# Patient Record
Sex: FEMALE | Race: WHITE | NOT HISPANIC OR LATINO | Employment: FULL TIME | URBAN - METROPOLITAN AREA
[De-identification: names, ages, dates, MRNs, and addresses within clinical notes are randomized per-mention and may not be internally consistent; named-entity substitution may affect disease eponyms.]

---

## 2023-03-07 DIAGNOSIS — Z79.01 ANTICOAGULATION ADEQUATE WITH ANTICOAGULANT THERAPY: Primary | ICD-10-CM

## 2023-05-04 ENCOUNTER — OFFICE VISIT (OUTPATIENT)
Dept: PRIMARY CARE | Facility: CLINIC | Age: 68
End: 2023-05-04
Payer: COMMERCIAL

## 2023-05-04 VITALS
HEART RATE: 88 BPM | HEIGHT: 64 IN | BODY MASS INDEX: 38.76 KG/M2 | DIASTOLIC BLOOD PRESSURE: 74 MMHG | SYSTOLIC BLOOD PRESSURE: 112 MMHG | TEMPERATURE: 97.2 F | WEIGHT: 227 LBS | OXYGEN SATURATION: 98 %

## 2023-05-04 DIAGNOSIS — I10 ESSENTIAL HYPERTENSION: Primary | ICD-10-CM

## 2023-05-04 DIAGNOSIS — R53.83 FATIGUE, UNSPECIFIED TYPE: ICD-10-CM

## 2023-05-04 DIAGNOSIS — I87.2 CHRONIC VENOUS INSUFFICIENCY: ICD-10-CM

## 2023-05-04 PROBLEM — H52.12 MYOPIA OF LEFT EYE WITH ASTIGMATISM AND PRESBYOPIA: Status: ACTIVE | Noted: 2023-05-04

## 2023-05-04 PROBLEM — I25.10 CAD (CORONARY ATHEROSCLEROTIC DISEASE): Status: ACTIVE | Noted: 2023-05-04

## 2023-05-04 PROBLEM — Z96.659 STATUS POST TOTAL KNEE REPLACEMENT: Status: ACTIVE | Noted: 2023-05-04

## 2023-05-04 PROBLEM — E55.9 VITAMIN D DEFICIENCY: Status: ACTIVE | Noted: 2023-05-04

## 2023-05-04 PROBLEM — H52.201 HYPEROPIA OF RIGHT EYE WITH ASTIGMATISM AND PRESBYOPIA: Status: ACTIVE | Noted: 2023-05-04

## 2023-05-04 PROBLEM — B39.9 HISTOPLASMOSIS: Status: ACTIVE | Noted: 2023-05-04

## 2023-05-04 PROBLEM — L72.0 INFECTED EPIDERMOID CYST: Status: RESOLVED | Noted: 2023-05-04 | Resolved: 2023-05-04

## 2023-05-04 PROBLEM — R63.4 RAPID WEIGHT LOSS: Status: RESOLVED | Noted: 2023-05-04 | Resolved: 2023-05-04

## 2023-05-04 PROBLEM — J45.901 ASTHMA EXACERBATION (HHS-HCC): Status: RESOLVED | Noted: 2023-05-04 | Resolved: 2023-05-04

## 2023-05-04 PROBLEM — B35.1 MYCOTIC TOENAILS: Status: ACTIVE | Noted: 2023-05-04

## 2023-05-04 PROBLEM — I48.0 AF (PAROXYSMAL ATRIAL FIBRILLATION) (MULTI): Status: ACTIVE | Noted: 2022-12-19

## 2023-05-04 PROBLEM — G47.00 INSOMNIA: Status: ACTIVE | Noted: 2023-05-04

## 2023-05-04 PROBLEM — M25.561 RIGHT KNEE PAIN: Status: RESOLVED | Noted: 2023-05-04 | Resolved: 2023-05-04

## 2023-05-04 PROBLEM — N83.202 CYST OF LEFT OVARY: Status: ACTIVE | Noted: 2023-05-04

## 2023-05-04 PROBLEM — E66.9 OBESITY: Status: RESOLVED | Noted: 2023-05-04 | Resolved: 2023-05-04

## 2023-05-04 PROBLEM — R94.2 ABNORMAL PFT: Status: ACTIVE | Noted: 2023-05-04

## 2023-05-04 PROBLEM — M17.11 PRIMARY OSTEOARTHRITIS OF RIGHT KNEE: Status: RESOLVED | Noted: 2023-05-04 | Resolved: 2023-05-04

## 2023-05-04 PROBLEM — M22.42 CHONDROMALACIA OF LEFT PATELLA: Status: RESOLVED | Noted: 2023-05-04 | Resolved: 2023-05-04

## 2023-05-04 PROBLEM — M25.562 LEFT KNEE PAIN: Status: RESOLVED | Noted: 2023-05-04 | Resolved: 2023-05-04

## 2023-05-04 PROBLEM — J20.9 ACUTE BRONCHITIS: Status: RESOLVED | Noted: 2023-05-04 | Resolved: 2023-05-04

## 2023-05-04 PROBLEM — R32 INCONTINENCE OF URINE: Status: ACTIVE | Noted: 2023-05-04

## 2023-05-04 PROBLEM — G47.34 SLEEP RELATED HYPOXIA: Status: ACTIVE | Noted: 2023-05-04

## 2023-05-04 PROBLEM — H20.9: Status: ACTIVE | Noted: 2023-05-04

## 2023-05-04 PROBLEM — K90.9 MALABSORPTION (HHS-HCC): Status: ACTIVE | Noted: 2023-05-04

## 2023-05-04 PROBLEM — M17.12 PRIMARY LOCALIZED OSTEOARTHROSIS OF LEFT LOWER LEG: Status: ACTIVE | Noted: 2023-05-04

## 2023-05-04 PROBLEM — J30.9 ALLERGIC RHINITIS: Status: ACTIVE | Noted: 2023-05-04

## 2023-05-04 PROBLEM — F32.A DEPRESSION, CONTROLLED: Status: ACTIVE | Noted: 2022-12-19

## 2023-05-04 PROBLEM — I31.9 PERICARDIAL DISEASE (HHS-HCC): Status: ACTIVE | Noted: 2023-05-04

## 2023-05-04 PROBLEM — Z96.1 PSEUDOPHAKIA OF RIGHT EYE: Status: ACTIVE | Noted: 2023-05-04

## 2023-05-04 PROBLEM — J45.40 MODERATE PERSISTENT ASTHMA WITHOUT COMPLICATION (HHS-HCC): Status: ACTIVE | Noted: 2023-05-04

## 2023-05-04 PROBLEM — D68.59 HYPERCOAGULABLE STATE (MULTI): Status: ACTIVE | Noted: 2023-05-04

## 2023-05-04 PROBLEM — R94.39 ABNORMAL STRESS TEST: Status: ACTIVE | Noted: 2023-05-04

## 2023-05-04 PROBLEM — H52.4 MYOPIA OF LEFT EYE WITH ASTIGMATISM AND PRESBYOPIA: Status: ACTIVE | Noted: 2023-05-04

## 2023-05-04 PROBLEM — Z98.84 BARIATRIC SURGERY STATUS: Status: ACTIVE | Noted: 2022-12-19

## 2023-05-04 PROBLEM — H52.4 HYPEROPIA OF RIGHT EYE WITH ASTIGMATISM AND PRESBYOPIA: Status: ACTIVE | Noted: 2023-05-04

## 2023-05-04 PROBLEM — F43.21 ACUTE ADJUSTMENT DISORDER WITH DEPRESSED MOOD: Status: ACTIVE | Noted: 2023-05-04

## 2023-05-04 PROBLEM — F32.0 DEPRESSION, MAJOR, SINGLE EPISODE, MILD (CMS-HCC): Status: ACTIVE | Noted: 2023-05-04

## 2023-05-04 PROBLEM — G47.19 EXCESSIVE DAYTIME SLEEPINESS: Status: ACTIVE | Noted: 2023-05-04

## 2023-05-04 PROBLEM — M71.21 SYNOVIAL CYST OF RIGHT KNEE: Status: RESOLVED | Noted: 2023-05-04 | Resolved: 2023-05-04

## 2023-05-04 PROBLEM — F33.1 MAJOR DEPRESSIVE DISORDER, RECURRENT EPISODE, MODERATE (MULTI): Status: ACTIVE | Noted: 2023-05-04

## 2023-05-04 PROBLEM — G47.33 OSA (OBSTRUCTIVE SLEEP APNEA): Status: ACTIVE | Noted: 2023-05-04

## 2023-05-04 PROBLEM — H52.01 HYPEROPIA OF RIGHT EYE WITH ASTIGMATISM AND PRESBYOPIA: Status: ACTIVE | Noted: 2023-05-04

## 2023-05-04 PROBLEM — M66.0 BAKER'S CYST, RUPTURED: Status: RESOLVED | Noted: 2023-05-04 | Resolved: 2023-05-04

## 2023-05-04 PROBLEM — E87.20 ACID EXCESS: Status: ACTIVE | Noted: 2023-05-04

## 2023-05-04 PROBLEM — H25.813 COMBINED FORM OF AGE-RELATED CATARACT, BOTH EYES: Status: ACTIVE | Noted: 2023-05-04

## 2023-05-04 PROBLEM — L08.9 INFECTED EPIDERMOID CYST: Status: RESOLVED | Noted: 2023-05-04 | Resolved: 2023-05-04

## 2023-05-04 PROBLEM — H52.202 MYOPIA OF LEFT EYE WITH ASTIGMATISM AND PRESBYOPIA: Status: ACTIVE | Noted: 2023-05-04

## 2023-05-04 LAB
ALANINE AMINOTRANSFERASE (SGPT) (U/L) IN SER/PLAS: 7 U/L (ref 7–45)
ALBUMIN (G/DL) IN SER/PLAS: 4.2 G/DL (ref 3.4–5)
ALKALINE PHOSPHATASE (U/L) IN SER/PLAS: 88 U/L (ref 33–136)
ANION GAP IN SER/PLAS: 15 MMOL/L (ref 10–20)
ASPARTATE AMINOTRANSFERASE (SGOT) (U/L) IN SER/PLAS: 13 U/L (ref 9–39)
BASOPHILS (10*3/UL) IN BLOOD BY AUTOMATED COUNT: 0.09 X10E9/L (ref 0–0.1)
BASOPHILS/100 LEUKOCYTES IN BLOOD BY AUTOMATED COUNT: 1.5 % (ref 0–2)
BILIRUBIN TOTAL (MG/DL) IN SER/PLAS: 0.4 MG/DL (ref 0–1.2)
CALCIDIOL (25 OH VITAMIN D3) (NG/ML) IN SER/PLAS: 27 NG/ML
CALCIUM (MG/DL) IN SER/PLAS: 10 MG/DL (ref 8.6–10.6)
CARBON DIOXIDE, TOTAL (MMOL/L) IN SER/PLAS: 28 MMOL/L (ref 21–32)
CHLORIDE (MMOL/L) IN SER/PLAS: 104 MMOL/L (ref 98–107)
COBALAMIN (VITAMIN B12) (PG/ML) IN SER/PLAS: 394 PG/ML (ref 211–911)
CREATININE (MG/DL) IN SER/PLAS: 0.96 MG/DL (ref 0.5–1.05)
EOSINOPHILS (10*3/UL) IN BLOOD BY AUTOMATED COUNT: 0.26 X10E9/L (ref 0–0.7)
EOSINOPHILS/100 LEUKOCYTES IN BLOOD BY AUTOMATED COUNT: 4.2 % (ref 0–6)
ERYTHROCYTE DISTRIBUTION WIDTH (RATIO) BY AUTOMATED COUNT: 14.6 % (ref 11.5–14.5)
ERYTHROCYTE MEAN CORPUSCULAR HEMOGLOBIN CONCENTRATION (G/DL) BY AUTOMATED: 30.8 G/DL (ref 32–36)
ERYTHROCYTE MEAN CORPUSCULAR VOLUME (FL) BY AUTOMATED COUNT: 89 FL (ref 80–100)
ERYTHROCYTES (10*6/UL) IN BLOOD BY AUTOMATED COUNT: 4.19 X10E12/L (ref 4–5.2)
FERRITIN (UG/LL) IN SER/PLAS: 21 UG/L (ref 8–150)
FOLATE (NG/ML) IN SER/PLAS: 8.8 NG/ML
GFR FEMALE: 65 ML/MIN/1.73M2
GLUCOSE (MG/DL) IN SER/PLAS: 99 MG/DL (ref 74–99)
HEMATOCRIT (%) IN BLOOD BY AUTOMATED COUNT: 37.3 % (ref 36–46)
HEMOGLOBIN (G/DL) IN BLOOD: 11.5 G/DL (ref 12–16)
IMMATURE GRANULOCYTES/100 LEUKOCYTES IN BLOOD BY AUTOMATED COUNT: 0.5 % (ref 0–0.9)
LEUKOCYTES (10*3/UL) IN BLOOD BY AUTOMATED COUNT: 6.2 X10E9/L (ref 4.4–11.3)
LYMPHOCYTES (10*3/UL) IN BLOOD BY AUTOMATED COUNT: 1.46 X10E9/L (ref 1.2–4.8)
LYMPHOCYTES/100 LEUKOCYTES IN BLOOD BY AUTOMATED COUNT: 23.7 % (ref 13–44)
MONOCYTES (10*3/UL) IN BLOOD BY AUTOMATED COUNT: 0.69 X10E9/L (ref 0.1–1)
MONOCYTES/100 LEUKOCYTES IN BLOOD BY AUTOMATED COUNT: 11.2 % (ref 2–10)
NEUTROPHILS (10*3/UL) IN BLOOD BY AUTOMATED COUNT: 3.62 X10E9/L (ref 1.2–7.7)
NEUTROPHILS/100 LEUKOCYTES IN BLOOD BY AUTOMATED COUNT: 58.9 % (ref 40–80)
NRBC (PER 100 WBCS) BY AUTOMATED COUNT: 0 /100 WBC (ref 0–0)
PLATELETS (10*3/UL) IN BLOOD AUTOMATED COUNT: 481 X10E9/L (ref 150–450)
POTASSIUM (MMOL/L) IN SER/PLAS: 4.5 MMOL/L (ref 3.5–5.3)
PROTEIN TOTAL: 6.9 G/DL (ref 6.4–8.2)
SODIUM (MMOL/L) IN SER/PLAS: 142 MMOL/L (ref 136–145)
THYROTROPIN (MIU/L) IN SER/PLAS BY DETECTION LIMIT <= 0.05 MIU/L: 4.79 MIU/L (ref 0.44–3.98)
THYROXINE (T4) FREE (NG/DL) IN SER/PLAS: 1.02 NG/DL (ref 0.78–1.48)
UREA NITROGEN (MG/DL) IN SER/PLAS: 14 MG/DL (ref 6–23)

## 2023-05-04 PROCEDURE — 1036F TOBACCO NON-USER: CPT | Performed by: NURSE PRACTITIONER

## 2023-05-04 PROCEDURE — 85025 COMPLETE CBC W/AUTO DIFF WBC: CPT

## 2023-05-04 PROCEDURE — 82306 VITAMIN D 25 HYDROXY: CPT

## 2023-05-04 PROCEDURE — 84443 ASSAY THYROID STIM HORMONE: CPT

## 2023-05-04 PROCEDURE — 3008F BODY MASS INDEX DOCD: CPT | Performed by: NURSE PRACTITIONER

## 2023-05-04 PROCEDURE — 82728 ASSAY OF FERRITIN: CPT

## 2023-05-04 PROCEDURE — 3074F SYST BP LT 130 MM HG: CPT | Performed by: NURSE PRACTITIONER

## 2023-05-04 PROCEDURE — 82746 ASSAY OF FOLIC ACID SERUM: CPT

## 2023-05-04 PROCEDURE — 99214 OFFICE O/P EST MOD 30 MIN: CPT | Performed by: NURSE PRACTITIONER

## 2023-05-04 PROCEDURE — 1159F MED LIST DOCD IN RCRD: CPT | Performed by: NURSE PRACTITIONER

## 2023-05-04 PROCEDURE — 84439 ASSAY OF FREE THYROXINE: CPT

## 2023-05-04 PROCEDURE — 3078F DIAST BP <80 MM HG: CPT | Performed by: NURSE PRACTITIONER

## 2023-05-04 PROCEDURE — 1160F RVW MEDS BY RX/DR IN RCRD: CPT | Performed by: NURSE PRACTITIONER

## 2023-05-04 PROCEDURE — 82607 VITAMIN B-12: CPT

## 2023-05-04 PROCEDURE — 80053 COMPREHEN METABOLIC PANEL: CPT

## 2023-05-04 RX ORDER — OXYCODONE AND ACETAMINOPHEN 5; 325 MG/1; MG/1
1 TABLET ORAL EVERY 6 HOURS PRN
COMMUNITY
End: 2023-11-22 | Stop reason: ALTCHOICE

## 2023-05-04 RX ORDER — DOXEPIN HYDROCHLORIDE 10 MG/1
CAPSULE ORAL
COMMUNITY
Start: 2020-12-01 | End: 2023-11-22

## 2023-05-04 RX ORDER — LOSARTAN POTASSIUM AND HYDROCHLOROTHIAZIDE 25; 100 MG/1; MG/1
1 TABLET ORAL DAILY
COMMUNITY
Start: 2023-03-18 | End: 2023-11-22 | Stop reason: SDUPTHER

## 2023-05-04 ASSESSMENT — PATIENT HEALTH QUESTIONNAIRE - PHQ9
1. LITTLE INTEREST OR PLEASURE IN DOING THINGS: NOT AT ALL
SUM OF ALL RESPONSES TO PHQ9 QUESTIONS 1 AND 2: 0
2. FEELING DOWN, DEPRESSED OR HOPELESS: NOT AT ALL

## 2023-05-04 ASSESSMENT — PAIN SCALES - GENERAL: PAINLEVEL: 3

## 2023-05-04 NOTE — PROGRESS NOTES
"Subjective   Patient ID: Astrid Ruffin is a 68 y.o. female who presents for Medication Question (Follow up 6m).    Presents for follow up for routine care.  Has been under the care of Orthopedics s/p left knee replacement.  Is 20 weeks post op - surgery went well but has vascular insufficiency residually,.  Is following up with vascualr / for further evaluation.  Ongoing pain. Is using Tylenol, doxepin, percocet -  which helps with sleep.  Has been unable to stop percocet for pain control. Having left muscle cramping in lateral thigh.  Is using different types of compression stockings - knee high (unable to wear long duration due to cuts off circulation and leaves ring), thigh high (does not stay in place).   Remains in physical therapy. Stopped muscle relaxers, do not help.    Noted new onset rapid HR at rest ? Tachycardia - BP at home ok, elevated HR highest 124. Running 101-124 during episodes.  Hx Afib, s/p ablation 6-7 years ago. Feels different that Afib episode.  Admits stopped taking bariatric vitamins in December. Correlated with start of symptoms.          Review of Systems    Objective   /74 (BP Location: Left arm, Patient Position: Sitting, BP Cuff Size: Large adult)   Pulse 88   Temp 36.2 °C (97.2 °F) (Oral)   Ht 1.626 m (5' 4\")   Wt 103 kg (227 lb)   SpO2 98%   BMI 38.96 kg/m²     Physical Exam  Vitals and nursing note reviewed.   Constitutional:       General: She is not in acute distress.     Appearance: Normal appearance. She is obese.   Cardiovascular:      Rate and Rhythm: Normal rate and regular rhythm.      Heart sounds: Normal heart sounds. No murmur heard.  Pulmonary:      Effort: Pulmonary effort is normal. No respiratory distress.      Breath sounds: Normal breath sounds.   Musculoskeletal:         General: Tenderness present.      Right lower le+ Edema present.      Left lower le+ Edema present.   Lymphadenopathy:      Cervical: No cervical adenopathy.   Skin:     " General: Skin is warm and dry.   Neurological:      Gait: Gait abnormal.   Psychiatric:         Mood and Affect: Mood normal.         Judgment: Judgment normal.         Assessment/Plan   Diagnoses and all orders for this visit:  Essential hypertension  Chronic venous insufficiency  -     Compression Stockings 20-30 mmHg  Fatigue, unspecified type  -     CBC and Auto Differential  -     Vitamin B12  -     Ferritin  -     Comprehensive metabolic panel  -     TSH with reflex to Free T4 if abnormal  -     Folate  -     Vitamin D 25-Hydroxy,Total  -     Thyroxine, Free

## 2023-06-05 NOTE — PATIENT INSTRUCTIONS
Check blood work to rule out kidney versus vitamin/iron deficiency as contributing cause of swelling.  Continue to try to elevated legs when at rest.   Script given for pantyhose compression stocking due to extensive swelling you are having. You can call a specialty clinic to get fitted for best results.    BP stable on current losartan dose. Refill given. Follow low salt diet.    Continue Xarelto.    Follow up in 3-4 months for BP check.

## 2023-06-06 LAB
ANTICARDIOLIPIN IGA ANTIBODY: <0.5 APL U/ML (ref 0–20)
ANTICARDIOLIPIN IGG ANTIBODY: <1.6 GPL U/ML (ref 0–20)
ANTICARDIOLIPIN IGM ANTIBODY: 0.4 MPL U/ML (ref 0–20)
BETA 2 GLYCOPROTEIN 1 IGA AB IN SERUM: <0.6 U/ML (ref 0–20)
BETA 2 GLYCOPROTEIN 1 IGG AB IN SERUM: <1.4 U/ML (ref 0–20)
BETA 2 GLYCOPROTEIN 1 IGM ANTIBODY IN SERUM: 0.4 U/ML (ref 0–20)

## 2023-09-11 DIAGNOSIS — Z79.01 ANTICOAGULATION ADEQUATE WITH ANTICOAGULANT THERAPY: ICD-10-CM

## 2023-09-21 PROBLEM — M79.3 LIPODERMATOSCLEROSIS: Status: ACTIVE | Noted: 2023-09-21

## 2023-09-21 PROBLEM — H26.491 PCO (POSTERIOR CAPSULAR OPACIFICATION), RIGHT: Status: ACTIVE | Noted: 2023-09-21

## 2023-09-21 PROBLEM — I89.0 LYMPHEDEMA: Status: ACTIVE | Noted: 2023-09-21

## 2023-09-21 PROBLEM — L23.7 ALLERGIC DERMATITIS DUE TO POISON SUMAC: Status: ACTIVE | Noted: 2023-09-21

## 2023-09-21 RX ORDER — ALBUTEROL SULFATE 90 UG/1
2 AEROSOL, METERED RESPIRATORY (INHALATION) EVERY 6 HOURS PRN
COMMUNITY
Start: 2022-09-22 | End: 2023-11-22 | Stop reason: SDUPTHER

## 2023-09-21 RX ORDER — ATORVASTATIN CALCIUM 40 MG/1
1 TABLET, FILM COATED ORAL NIGHTLY
COMMUNITY
Start: 2022-05-05 | End: 2023-11-22

## 2023-09-21 RX ORDER — LAMOTRIGINE 100 MG/1
1 TABLET ORAL DAILY
COMMUNITY
Start: 2021-11-09 | End: 2023-11-22 | Stop reason: WASHOUT

## 2023-09-21 RX ORDER — TERBINAFINE HYDROCHLORIDE 250 MG/1
1 TABLET ORAL DAILY
COMMUNITY
Start: 2022-02-01 | End: 2023-11-21 | Stop reason: ALTCHOICE

## 2023-09-21 RX ORDER — ESCITALOPRAM OXALATE 20 MG/1
1 TABLET ORAL DAILY
COMMUNITY
Start: 2018-10-01 | End: 2023-11-22

## 2023-09-21 RX ORDER — CYCLOBENZAPRINE HCL 10 MG
1 TABLET ORAL NIGHTLY
COMMUNITY
Start: 2022-05-19 | End: 2023-11-22

## 2023-09-21 RX ORDER — ACETAMINOPHEN 325 MG/1
2 TABLET ORAL EVERY 6 HOURS PRN
COMMUNITY
Start: 2022-05-10 | End: 2023-11-22

## 2023-10-02 ENCOUNTER — LAB REQUISITION (OUTPATIENT)
Dept: LAB | Facility: HOSPITAL | Age: 68
End: 2023-10-02
Payer: COMMERCIAL

## 2023-10-02 DIAGNOSIS — U07.1 COVID-19: ICD-10-CM

## 2023-10-02 DIAGNOSIS — U07.1 COVID: ICD-10-CM

## 2023-10-02 LAB
ALBUMIN SERPL BCP-MCNC: 4.2 G/DL (ref 3.4–5)
ALP SERPL-CCNC: 85 U/L (ref 33–136)
ALT SERPL W P-5'-P-CCNC: 10 U/L (ref 7–45)
ANION GAP SERPL CALC-SCNC: 15 MMOL/L (ref 10–20)
AST SERPL W P-5'-P-CCNC: 12 U/L (ref 9–39)
BASOPHILS # BLD AUTO: 0.06 X10*3/UL (ref 0–0.1)
BASOPHILS NFR BLD AUTO: 0.9 %
BILIRUB SERPL-MCNC: 0.3 MG/DL (ref 0–1.2)
BUN SERPL-MCNC: 18 MG/DL (ref 6–23)
CALCIUM SERPL-MCNC: 9.7 MG/DL (ref 8.6–10.6)
CHLORIDE SERPL-SCNC: 104 MMOL/L (ref 98–107)
CO2 SERPL-SCNC: 27 MMOL/L (ref 21–32)
CREAT SERPL-MCNC: 0.86 MG/DL (ref 0.5–1.05)
EOSINOPHIL # BLD AUTO: 0.24 X10*3/UL (ref 0–0.7)
EOSINOPHIL NFR BLD AUTO: 3.5 %
ERYTHROCYTE [DISTWIDTH] IN BLOOD BY AUTOMATED COUNT: 14.3 % (ref 11.5–14.5)
GFR SERPL CREATININE-BSD FRML MDRD: 75 ML/MIN/1.73M*2
GLUCOSE SERPL-MCNC: 67 MG/DL (ref 74–99)
HCT VFR BLD AUTO: 32.2 % (ref 36–46)
HGB BLD-MCNC: 10.8 G/DL (ref 12–16)
IMM GRANULOCYTES # BLD AUTO: 0.03 X10*3/UL (ref 0–0.7)
IMM GRANULOCYTES NFR BLD AUTO: 0.4 % (ref 0–0.9)
LYMPHOCYTES # BLD AUTO: 1.4 X10*3/UL (ref 1.2–4.8)
LYMPHOCYTES NFR BLD AUTO: 20.5 %
MCH RBC QN AUTO: 29 PG (ref 26–34)
MCHC RBC AUTO-ENTMCNC: 33.5 G/DL (ref 32–36)
MCV RBC AUTO: 86 FL (ref 80–100)
MONOCYTES # BLD AUTO: 0.75 X10*3/UL (ref 0.1–1)
MONOCYTES NFR BLD AUTO: 11 %
NEUTROPHILS # BLD AUTO: 4.34 X10*3/UL (ref 1.2–7.7)
NEUTROPHILS NFR BLD AUTO: 63.7 %
NRBC BLD-RTO: 0 /100 WBCS (ref 0–0)
PLATELET # BLD AUTO: 466 X10*3/UL (ref 150–450)
PMV BLD AUTO: 9.4 FL (ref 7.5–11.5)
POTASSIUM SERPL-SCNC: 4.3 MMOL/L (ref 3.5–5.3)
PROT SERPL-MCNC: 7 G/DL (ref 6.4–8.2)
RBC # BLD AUTO: 3.73 X10*6/UL (ref 4–5.2)
SODIUM SERPL-SCNC: 142 MMOL/L (ref 136–145)
WBC # BLD AUTO: 6.8 X10*3/UL (ref 4.4–11.3)

## 2023-10-02 PROCEDURE — 80053 COMPREHEN METABOLIC PANEL: CPT

## 2023-10-02 PROCEDURE — 85025 COMPLETE CBC W/AUTO DIFF WBC: CPT

## 2023-10-13 ENCOUNTER — APPOINTMENT (OUTPATIENT)
Dept: GASTROENTEROLOGY | Facility: HOSPITAL | Age: 68
End: 2023-10-13
Payer: COMMERCIAL

## 2023-10-19 ENCOUNTER — TRANSCRIBE ORDERS (OUTPATIENT)
Dept: GASTROENTEROLOGY | Facility: HOSPITAL | Age: 68
End: 2023-10-19
Payer: COMMERCIAL

## 2023-10-19 DIAGNOSIS — Z00.6 RESEARCH STUDY PATIENT: Primary | ICD-10-CM

## 2023-10-20 ENCOUNTER — APPOINTMENT (OUTPATIENT)
Dept: CARDIOLOGY | Facility: CLINIC | Age: 68
End: 2023-10-20
Payer: COMMERCIAL

## 2023-10-20 ENCOUNTER — HOSPITAL ENCOUNTER (OUTPATIENT)
Dept: RADIOLOGY | Facility: HOSPITAL | Age: 68
Discharge: HOME | End: 2023-10-20
Payer: COMMERCIAL

## 2023-10-20 ENCOUNTER — CLINICAL SUPPORT (OUTPATIENT)
Dept: GASTROENTEROLOGY | Facility: HOSPITAL | Age: 68
End: 2023-10-20
Payer: COMMERCIAL

## 2023-10-20 DIAGNOSIS — Z00.6 RESEARCH STUDY PATIENT: ICD-10-CM

## 2023-10-20 DIAGNOSIS — Z00.6 ENCOUNTER FOR EXAMINATION FOR NORMAL COMPARISON AND CONTROL IN CLINICAL RESEARCH PROGRAM: ICD-10-CM

## 2023-10-20 PROCEDURE — A9575 INJ GADOTERATE MEGLUMI 0.1ML: HCPCS | Performed by: INTERNAL MEDICINE

## 2023-10-20 PROCEDURE — 2550000001 HC RX 255 CONTRASTS: Performed by: INTERNAL MEDICINE

## 2023-10-20 PROCEDURE — 91200 LIVER ELASTOGRAPHY: CPT

## 2023-10-20 PROCEDURE — 91200 LIVER ELASTOGRAPHY: CPT | Performed by: INTERNAL MEDICINE

## 2023-10-20 PROCEDURE — 70553 MRI BRAIN STEM W/O & W/DYE: CPT | Mod: ONBASE

## 2023-10-20 PROCEDURE — 70553 MRI BRAIN STEM W/O & W/DYE: CPT | Mod: ONBASE | Performed by: RADIOLOGY

## 2023-10-20 RX ORDER — GADOTERATE MEGLUMINE 376.9 MG/ML
20 INJECTION INTRAVENOUS
Status: COMPLETED | OUTPATIENT
Start: 2023-10-20 | End: 2023-10-20

## 2023-10-20 RX ADMIN — GADOTERATE MEGLUMINE 20 ML: 376.9 INJECTION INTRAVENOUS at 15:20

## 2023-11-16 ENCOUNTER — LAB (OUTPATIENT)
Dept: LAB | Facility: LAB | Age: 68
End: 2023-11-16
Payer: COMMERCIAL

## 2023-11-16 ENCOUNTER — OFFICE VISIT (OUTPATIENT)
Dept: OPHTHALMOLOGY | Facility: CLINIC | Age: 68
End: 2023-11-16
Payer: COMMERCIAL

## 2023-11-16 DIAGNOSIS — H52.01 HYPEROPIA OF RIGHT EYE WITH ASTIGMATISM AND PRESBYOPIA: ICD-10-CM

## 2023-11-16 DIAGNOSIS — H52.201 HYPEROPIA OF RIGHT EYE WITH ASTIGMATISM AND PRESBYOPIA: ICD-10-CM

## 2023-11-16 DIAGNOSIS — H20.9 IRIDOCYCLITIS, RIGHT EYE: ICD-10-CM

## 2023-11-16 DIAGNOSIS — H53.8 BLURRED VISION: ICD-10-CM

## 2023-11-16 DIAGNOSIS — H52.4 HYPEROPIA OF RIGHT EYE WITH ASTIGMATISM AND PRESBYOPIA: ICD-10-CM

## 2023-11-16 DIAGNOSIS — B00.1 RECURRENT COLD SORES: ICD-10-CM

## 2023-11-16 DIAGNOSIS — H53.8 BLURRED VISION: Primary | ICD-10-CM

## 2023-11-16 PROBLEM — H30.21 INTERMEDIATE UVEITIS OF RIGHT EYE: Status: ACTIVE | Noted: 2023-11-16

## 2023-11-16 LAB
HERPES SIMPLEX VIRUS 1 IGG: 8 INDEX
HERPES SIMPLEX VIRUS 2 IGG: <0.2 INDEX

## 2023-11-16 PROCEDURE — 99214 OFFICE O/P EST MOD 30 MIN: CPT | Performed by: OPHTHALMOLOGY

## 2023-11-16 PROCEDURE — 36415 COLL VENOUS BLD VENIPUNCTURE: CPT

## 2023-11-16 PROCEDURE — 92134 CPTRZ OPH DX IMG PST SGM RTA: CPT | Mod: BILATERAL PROCEDURE | Performed by: OPHTHALMOLOGY

## 2023-11-16 PROCEDURE — 86696 HERPES SIMPLEX TYPE 2 TEST: CPT

## 2023-11-16 PROCEDURE — 86695 HERPES SIMPLEX TYPE 1 TEST: CPT

## 2023-11-16 RX ORDER — VALACYCLOVIR HYDROCHLORIDE 500 MG/1
1000 TABLET, FILM COATED ORAL 3 TIMES DAILY
Qty: 42 TABLET | Refills: 0 | Status: SHIPPED | OUTPATIENT
Start: 2023-11-16 | End: 2023-11-23

## 2023-11-16 ASSESSMENT — CONF VISUAL FIELD
OD_INFERIOR_NASAL_RESTRICTION: 0
OS_INFERIOR_NASAL_RESTRICTION: 0
OD_INFERIOR_TEMPORAL_RESTRICTION: 0
OD_NORMAL: 1
OD_SUPERIOR_TEMPORAL_RESTRICTION: 0
OS_SUPERIOR_NASAL_RESTRICTION: 0
OS_INFERIOR_TEMPORAL_RESTRICTION: 0
OD_SUPERIOR_NASAL_RESTRICTION: 0
OS_NORMAL: 1
OS_SUPERIOR_TEMPORAL_RESTRICTION: 0

## 2023-11-16 ASSESSMENT — VISUAL ACUITY
METHOD: SNELLEN - LINEAR
OS_SC: 20/20
OD_SC: 20/50+2

## 2023-11-16 ASSESSMENT — CUP TO DISC RATIO
OD_RATIO: 0.25
OS_RATIO: 0.25

## 2023-11-16 ASSESSMENT — EXTERNAL EXAM - RIGHT EYE: OD_EXAM: NORMAL

## 2023-11-16 ASSESSMENT — TONOMETRY
OD_IOP_MMHG: 14
OS_IOP_MMHG: 14
IOP_METHOD: GOLDMANN APPLANATION

## 2023-11-16 ASSESSMENT — EXTERNAL EXAM - LEFT EYE: OS_EXAM: NORMAL

## 2023-11-16 ASSESSMENT — ENCOUNTER SYMPTOMS: CARDIOVASCULAR NEGATIVE: 1

## 2023-11-16 ASSESSMENT — SLIT LAMP EXAM - LIDS
COMMENTS: NORMAL
COMMENTS: NORMAL

## 2023-11-16 NOTE — PROGRESS NOTES
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.9 Iridocyclitis, right eye-Improving  2 H25.812 Combined form of age-related cataract, left eye-Worsening  3 H26.491 Pco (posterior capsular opacification), right-Stable  4 H52.01 Hyperopia of right eye with astigmatism and presbyopia-Stable  5 H52.12 Myopia of left eye with astigmatism and presbyopia-Stable  6 Z96.1 Pseudophakia of right eye-Stable          Discussion    Combined form of age-related cataract, left eyeH25.812  Worsening  Defer CE OS for now since pt just recently had knee replacement sx    PCO (posterior capsular opacification), [id number]  Mild  Monitor    Iridocyclitis, right eyeH20.9  Mild  activity today  Follow up 6m    she has KP that are not fully become  pigment OD        Hi quality OCT  scans obtained  signal good    OCT OD - Normal Foveal Contour, No Edema, IS/OS Junction Normal  OCT OS - Normal Foveal Contour, No Edema, IS/OS Junction Normal

## 2023-11-21 NOTE — PROGRESS NOTES
"Subjective   Patient ID: Astrid Ruffin is a 68 y.o. female who presents for Follow-up and Nasal Congestion.    Presents for follow up and nasal congestion    Early September at a shower - sick for 2.5 weeks  Came back and has been over a month  Early November treated with doxycycline, tessalon  Eye doctor seen - uveitis flared  On antiviral now  Predominantly having nasal congestion  Deep breathing hurts in central back.  Denies any cough, chest congestion.   Has tried some OTCs for symptoms.      Albuterol has beenhelpful  Now using in AM -   Cannot get deep breath        Review of Systems    Objective   /78 (BP Location: Left arm, Patient Position: Sitting, BP Cuff Size: Large adult)   Pulse 88   Temp 36.6 °C (97.8 °F) (Oral)   Ht 1.626 m (5' 4\")   Wt 102 kg (225 lb 3.2 oz)   SpO2 99%   BMI 38.66 kg/m²     Physical Exam  Vitals and nursing note reviewed.   Constitutional:       General: She is not in acute distress.     Appearance: Normal appearance.   HENT:      Head: Normocephalic.      Right Ear: Ear canal and external ear normal. A middle ear effusion is present.      Left Ear: Ear canal and external ear normal. A middle ear effusion is present.      Mouth/Throat:      Mouth: Mucous membranes are moist.      Dentition: Normal dentition.      Pharynx: Oropharynx is clear. Posterior oropharyngeal erythema present.   Cardiovascular:      Rate and Rhythm: Normal rate and regular rhythm.      Heart sounds: Normal heart sounds.   Pulmonary:      Effort: Respiratory distress present.      Breath sounds: Normal breath sounds.   Lymphadenopathy:      Cervical: Cervical adenopathy present.   Skin:     Capillary Refill: Capillary refill takes less than 2 seconds.         Assessment/Plan   Diagnoses and all orders for this visit:  Essential hypertension  -     albuterol 90 mcg/actuation inhaler; Inhale 2 puffs every 6 hours if needed for shortness of breath.  -     losartan-hydrochlorothiazide (Hyzaar) 100-25 " mg tablet; Take 1 tablet by mouth once daily.  -     CBC and Auto Differential  -     Vitamin B12  -     Folate  -     Ferritin  -     Comprehensive metabolic panel  -     Lipid panel  -     TSH  -     T4, free  -     Vitamin D 25-Hydroxy,Total (for eval of Vitamin D levels)  Anticoagulation adequate with anticoagulant therapy  -     rivaroxaban (Xarelto) 20 mg tablet; TAKE 1 TABLET BY MOUTH ONCE DAILY  Chronic venous insufficiency  -     Disability Placard  Primary localized osteoarthrosis of left lower leg  -     Disability Placard  Acute cough  -     albuterol 90 mcg/actuation inhaler; Inhale 2 puffs every 6 hours if needed for shortness of breath.  -     XR chest 2 views; Future

## 2023-11-22 ENCOUNTER — OFFICE VISIT (OUTPATIENT)
Dept: PRIMARY CARE | Facility: CLINIC | Age: 68
End: 2023-11-22
Payer: COMMERCIAL

## 2023-11-22 VITALS
DIASTOLIC BLOOD PRESSURE: 78 MMHG | OXYGEN SATURATION: 99 % | BODY MASS INDEX: 38.45 KG/M2 | SYSTOLIC BLOOD PRESSURE: 102 MMHG | HEIGHT: 64 IN | TEMPERATURE: 97.8 F | HEART RATE: 88 BPM | WEIGHT: 225.2 LBS

## 2023-11-22 DIAGNOSIS — I87.2 CHRONIC VENOUS INSUFFICIENCY: ICD-10-CM

## 2023-11-22 DIAGNOSIS — R05.1 ACUTE COUGH: ICD-10-CM

## 2023-11-22 DIAGNOSIS — M17.12 PRIMARY LOCALIZED OSTEOARTHROSIS OF LEFT LOWER LEG: ICD-10-CM

## 2023-11-22 DIAGNOSIS — Z79.01 ANTICOAGULATION ADEQUATE WITH ANTICOAGULANT THERAPY: ICD-10-CM

## 2023-11-22 DIAGNOSIS — I10 ESSENTIAL HYPERTENSION: Primary | ICD-10-CM

## 2023-11-22 LAB
ALBUMIN SERPL BCP-MCNC: 4.2 G/DL (ref 3.4–5)
ALP SERPL-CCNC: 71 U/L (ref 33–136)
ALT SERPL W P-5'-P-CCNC: 10 U/L (ref 7–45)
ANION GAP SERPL CALC-SCNC: 13 MMOL/L (ref 10–20)
AST SERPL W P-5'-P-CCNC: 14 U/L (ref 9–39)
BASOPHILS # BLD AUTO: 0.08 X10*3/UL (ref 0–0.1)
BASOPHILS NFR BLD AUTO: 1.4 %
BILIRUB SERPL-MCNC: 0.4 MG/DL (ref 0–1.2)
BUN SERPL-MCNC: 14 MG/DL (ref 6–23)
CALCIUM SERPL-MCNC: 9.4 MG/DL (ref 8.6–10.6)
CHLORIDE SERPL-SCNC: 105 MMOL/L (ref 98–107)
CHOLEST SERPL-MCNC: 218 MG/DL (ref 0–199)
CHOLESTEROL/HDL RATIO: 2.2
CO2 SERPL-SCNC: 28 MMOL/L (ref 21–32)
CREAT SERPL-MCNC: 0.88 MG/DL (ref 0.5–1.05)
EOSINOPHIL # BLD AUTO: 0.27 X10*3/UL (ref 0–0.7)
EOSINOPHIL NFR BLD AUTO: 4.6 %
ERYTHROCYTE [DISTWIDTH] IN BLOOD BY AUTOMATED COUNT: 13.8 % (ref 11.5–14.5)
FERRITIN SERPL-MCNC: 15 NG/ML (ref 8–150)
GFR SERPL CREATININE-BSD FRML MDRD: 73 ML/MIN/1.73M*2
GLUCOSE SERPL-MCNC: 86 MG/DL (ref 74–99)
HCT VFR BLD AUTO: 33.8 % (ref 36–46)
HDLC SERPL-MCNC: 98.7 MG/DL
HGB BLD-MCNC: 10.2 G/DL (ref 12–16)
IMM GRANULOCYTES # BLD AUTO: 0.02 X10*3/UL (ref 0–0.7)
IMM GRANULOCYTES NFR BLD AUTO: 0.3 % (ref 0–0.9)
LDLC SERPL CALC-MCNC: 99 MG/DL
LYMPHOCYTES # BLD AUTO: 1.45 X10*3/UL (ref 1.2–4.8)
LYMPHOCYTES NFR BLD AUTO: 24.6 %
MCH RBC QN AUTO: 26 PG (ref 26–34)
MCHC RBC AUTO-ENTMCNC: 30.2 G/DL (ref 32–36)
MCV RBC AUTO: 86 FL (ref 80–100)
MONOCYTES # BLD AUTO: 0.67 X10*3/UL (ref 0.1–1)
MONOCYTES NFR BLD AUTO: 11.4 %
NEUTROPHILS # BLD AUTO: 3.4 X10*3/UL (ref 1.2–7.7)
NEUTROPHILS NFR BLD AUTO: 57.7 %
NON HDL CHOLESTEROL: 119 MG/DL (ref 0–149)
NRBC BLD-RTO: 0 /100 WBCS (ref 0–0)
PLATELET # BLD AUTO: 448 X10*3/UL (ref 150–450)
POTASSIUM SERPL-SCNC: 4.4 MMOL/L (ref 3.5–5.3)
PROT SERPL-MCNC: 6.7 G/DL (ref 6.4–8.2)
RBC # BLD AUTO: 3.93 X10*6/UL (ref 4–5.2)
SODIUM SERPL-SCNC: 142 MMOL/L (ref 136–145)
TRIGL SERPL-MCNC: 100 MG/DL (ref 0–149)
VLDL: 20 MG/DL (ref 0–40)
WBC # BLD AUTO: 5.9 X10*3/UL (ref 4.4–11.3)

## 2023-11-22 PROCEDURE — 85025 COMPLETE CBC W/AUTO DIFF WBC: CPT

## 2023-11-22 PROCEDURE — 80061 LIPID PANEL: CPT

## 2023-11-22 PROCEDURE — 3008F BODY MASS INDEX DOCD: CPT | Performed by: NURSE PRACTITIONER

## 2023-11-22 PROCEDURE — 99214 OFFICE O/P EST MOD 30 MIN: CPT | Performed by: NURSE PRACTITIONER

## 2023-11-22 PROCEDURE — 3078F DIAST BP <80 MM HG: CPT | Performed by: NURSE PRACTITIONER

## 2023-11-22 PROCEDURE — 82306 VITAMIN D 25 HYDROXY: CPT

## 2023-11-22 PROCEDURE — 84443 ASSAY THYROID STIM HORMONE: CPT

## 2023-11-22 PROCEDURE — 84439 ASSAY OF FREE THYROXINE: CPT

## 2023-11-22 PROCEDURE — 82746 ASSAY OF FOLIC ACID SERUM: CPT

## 2023-11-22 PROCEDURE — 1160F RVW MEDS BY RX/DR IN RCRD: CPT | Performed by: NURSE PRACTITIONER

## 2023-11-22 PROCEDURE — 82607 VITAMIN B-12: CPT

## 2023-11-22 PROCEDURE — 80053 COMPREHEN METABOLIC PANEL: CPT

## 2023-11-22 PROCEDURE — 82728 ASSAY OF FERRITIN: CPT

## 2023-11-22 PROCEDURE — 1036F TOBACCO NON-USER: CPT | Performed by: NURSE PRACTITIONER

## 2023-11-22 PROCEDURE — 36415 COLL VENOUS BLD VENIPUNCTURE: CPT

## 2023-11-22 PROCEDURE — 3074F SYST BP LT 130 MM HG: CPT | Performed by: NURSE PRACTITIONER

## 2023-11-22 PROCEDURE — 1159F MED LIST DOCD IN RCRD: CPT | Performed by: NURSE PRACTITIONER

## 2023-11-22 PROCEDURE — 1126F AMNT PAIN NOTED NONE PRSNT: CPT | Performed by: NURSE PRACTITIONER

## 2023-11-22 RX ORDER — LOSARTAN POTASSIUM AND HYDROCHLOROTHIAZIDE 25; 100 MG/1; MG/1
1 TABLET ORAL DAILY
Qty: 90 TABLET | Refills: 1 | Status: SHIPPED | OUTPATIENT
Start: 2023-11-22 | End: 2024-05-31 | Stop reason: SDUPTHER

## 2023-11-22 RX ORDER — ALBUTEROL SULFATE 90 UG/1
2 AEROSOL, METERED RESPIRATORY (INHALATION) EVERY 6 HOURS PRN
Qty: 18 G | Refills: 1 | Status: SHIPPED | OUTPATIENT
Start: 2023-11-22 | End: 2024-03-20

## 2023-11-22 ASSESSMENT — ENCOUNTER SYMPTOMS
DEPRESSION: 0
OCCASIONAL FEELINGS OF UNSTEADINESS: 0
LOSS OF SENSATION IN FEET: 0

## 2023-11-22 ASSESSMENT — PAIN SCALES - GENERAL: PAINLEVEL: 0-NO PAIN

## 2023-11-22 NOTE — PATIENT INSTRUCTIONS
BP well controlled on current medication  Follow a low salt diet.  Increase physical exercise as tolerated with knee pain    Continue Xarelto as prescribed.    Cough: Chest XR ordered.  Plan to start antibiotic based on WBC results  Continue to use rescue inhaler as needed.  Discussed importance of taking Bariatric vitamins regularly  Check blood work for vitamin levels.    Follow up in 6 months, sooner as needed.

## 2023-11-28 ENCOUNTER — HOSPITAL ENCOUNTER (OUTPATIENT)
Dept: RADIOLOGY | Facility: HOSPITAL | Age: 68
Discharge: HOME | End: 2023-11-28
Payer: COMMERCIAL

## 2023-11-28 DIAGNOSIS — R05.1 ACUTE COUGH: ICD-10-CM

## 2023-11-28 PROCEDURE — 71046 X-RAY EXAM CHEST 2 VIEWS: CPT | Performed by: RADIOLOGY

## 2023-11-28 PROCEDURE — 71046 X-RAY EXAM CHEST 2 VIEWS: CPT | Mod: FY

## 2023-11-29 LAB
25(OH)D3 SERPL-MCNC: 23 NG/ML (ref 30–100)
FOLATE SERPL-MCNC: 10.7 NG/ML
T4 FREE SERPL-MCNC: 1.05 NG/DL (ref 0.78–1.48)
TSH SERPL-ACNC: 3.09 MIU/L (ref 0.44–3.98)
VIT B12 SERPL-MCNC: 336 PG/ML (ref 211–911)

## 2023-11-30 DIAGNOSIS — R05.1 ACUTE COUGH: Primary | ICD-10-CM

## 2023-11-30 RX ORDER — METHYLPREDNISOLONE 4 MG/1
TABLET ORAL
Qty: 21 TABLET | Refills: 0 | Status: SHIPPED | OUTPATIENT
Start: 2023-11-30 | End: 2023-12-06 | Stop reason: SDUPTHER

## 2023-12-06 DIAGNOSIS — R05.1 ACUTE COUGH: ICD-10-CM

## 2023-12-06 RX ORDER — METHYLPREDNISOLONE 4 MG/1
TABLET ORAL
Qty: 21 TABLET | Refills: 0 | Status: SHIPPED | OUTPATIENT
Start: 2023-12-06 | End: 2023-12-14

## 2023-12-07 PROCEDURE — RXMED WILLOW AMBULATORY MEDICATION CHARGE

## 2023-12-08 ENCOUNTER — PHARMACY VISIT (OUTPATIENT)
Dept: PHARMACY | Facility: CLINIC | Age: 68
End: 2023-12-08
Payer: COMMERCIAL

## 2023-12-08 ENCOUNTER — OFFICE VISIT (OUTPATIENT)
Dept: OPHTHALMOLOGY | Facility: CLINIC | Age: 68
End: 2023-12-08
Payer: COMMERCIAL

## 2023-12-08 DIAGNOSIS — H52.12 MYOPIA OF LEFT EYE WITH ASTIGMATISM AND PRESBYOPIA: ICD-10-CM

## 2023-12-08 DIAGNOSIS — Z96.1 PSEUDOPHAKIA OF RIGHT EYE: ICD-10-CM

## 2023-12-08 DIAGNOSIS — H30.21 INTERMEDIATE UVEITIS OF RIGHT EYE: Primary | ICD-10-CM

## 2023-12-08 DIAGNOSIS — D68.59 HYPERCOAGULABLE STATE (MULTI): ICD-10-CM

## 2023-12-08 DIAGNOSIS — H52.4 MYOPIA OF LEFT EYE WITH ASTIGMATISM AND PRESBYOPIA: ICD-10-CM

## 2023-12-08 DIAGNOSIS — H52.202 MYOPIA OF LEFT EYE WITH ASTIGMATISM AND PRESBYOPIA: ICD-10-CM

## 2023-12-08 PROCEDURE — 99213 OFFICE O/P EST LOW 20 MIN: CPT | Performed by: OPHTHALMOLOGY

## 2023-12-08 PROCEDURE — 92134 CPTRZ OPH DX IMG PST SGM RTA: CPT | Mod: BILATERAL PROCEDURE | Performed by: OPHTHALMOLOGY

## 2023-12-08 RX ORDER — VALACYCLOVIR HYDROCHLORIDE 500 MG/1
1000 TABLET, FILM COATED ORAL 3 TIMES DAILY
Qty: 42 TABLET | Refills: 0 | Status: SHIPPED | OUTPATIENT
Start: 2023-12-08 | End: 2023-12-15

## 2023-12-08 ASSESSMENT — CONF VISUAL FIELD
OS_SUPERIOR_TEMPORAL_RESTRICTION: 0
OS_INFERIOR_NASAL_RESTRICTION: 0
OD_INFERIOR_NASAL_RESTRICTION: 0
OD_INFERIOR_TEMPORAL_RESTRICTION: 0
OS_SUPERIOR_NASAL_RESTRICTION: 0
OS_INFERIOR_TEMPORAL_RESTRICTION: 0
OS_NORMAL: 1
OD_NORMAL: 1
OD_SUPERIOR_TEMPORAL_RESTRICTION: 0
OD_SUPERIOR_NASAL_RESTRICTION: 0

## 2023-12-08 ASSESSMENT — ENCOUNTER SYMPTOMS: EYES NEGATIVE: 1

## 2023-12-08 ASSESSMENT — EXTERNAL EXAM - LEFT EYE: OS_EXAM: NORMAL

## 2023-12-08 ASSESSMENT — TONOMETRY
IOP_METHOD: GOLDMANN APPLANATION
OS_IOP_MMHG: 15
OD_IOP_MMHG: 15

## 2023-12-08 ASSESSMENT — VISUAL ACUITY
OD_SC: 20/60-2
OS_SC: 20/20
METHOD: SNELLEN - LINEAR

## 2023-12-08 ASSESSMENT — SLIT LAMP EXAM - LIDS
COMMENTS: NORMAL
COMMENTS: NORMAL

## 2023-12-08 ASSESSMENT — EXTERNAL EXAM - RIGHT EYE: OD_EXAM: NORMAL

## 2023-12-08 NOTE — PROGRESS NOTES
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.9 Iridocyclitis, right eye-Improving  2 H25.812 Combined form of age-related cataract, left eye-Stable  3 H52.01 Hyperopia of right eye with astigmatism and presbyopia-Stable  4 H52.12 Myopia of left eye with astigmatism and presbyopia-Stable  5 Z96.1 Pseudophakia of right eye-Stable      1 Anterior uveitis OD non granulomatous improving onPF BID  hx of HSV positive  empirically added Vaktrex 500 MG BID x 7 days  Add pf BID x 7 days before you see Dr Munoz  Continue another r week then taper francoise 1 week    3 week total  Get Yag now    now f/u after  months  no uveitis   on no drops currently         Hi quality OCT  scans obtained  signal good    OCT OD - Normal Foveal Contour, No Edema, IS/OS Junction Normal  OCT OS - Normal Foveal Contour, No Edema, IS/OS Junction Normal    additional commnents:

## 2023-12-19 ENCOUNTER — OFFICE VISIT (OUTPATIENT)
Dept: ORTHOPEDIC SURGERY | Facility: CLINIC | Age: 68
End: 2023-12-19
Payer: COMMERCIAL

## 2023-12-19 ENCOUNTER — ANCILLARY PROCEDURE (OUTPATIENT)
Dept: RADIOLOGY | Facility: CLINIC | Age: 68
End: 2023-12-19
Payer: COMMERCIAL

## 2023-12-19 DIAGNOSIS — M76.32 ILIOTIBIAL BAND SYNDROME OF LEFT SIDE: Primary | ICD-10-CM

## 2023-12-19 DIAGNOSIS — Z96.652 AFTERCARE FOLLOWING LEFT KNEE JOINT REPLACEMENT SURGERY: ICD-10-CM

## 2023-12-19 DIAGNOSIS — Z47.1 AFTERCARE FOLLOWING LEFT KNEE JOINT REPLACEMENT SURGERY: ICD-10-CM

## 2023-12-19 PROCEDURE — 73562 X-RAY EXAM OF KNEE 3: CPT | Mod: LT

## 2023-12-19 PROCEDURE — 99213 OFFICE O/P EST LOW 20 MIN: CPT | Performed by: ORTHOPAEDIC SURGERY

## 2023-12-19 PROCEDURE — 1159F MED LIST DOCD IN RCRD: CPT | Performed by: ORTHOPAEDIC SURGERY

## 2023-12-19 PROCEDURE — 1160F RVW MEDS BY RX/DR IN RCRD: CPT | Performed by: ORTHOPAEDIC SURGERY

## 2023-12-19 PROCEDURE — 1036F TOBACCO NON-USER: CPT | Performed by: ORTHOPAEDIC SURGERY

## 2023-12-19 PROCEDURE — 73562 X-RAY EXAM OF KNEE 3: CPT | Mod: LEFT SIDE | Performed by: ORTHOPAEDIC SURGERY

## 2023-12-19 PROCEDURE — 1126F AMNT PAIN NOTED NONE PRSNT: CPT | Performed by: ORTHOPAEDIC SURGERY

## 2023-12-19 PROCEDURE — 3008F BODY MASS INDEX DOCD: CPT | Performed by: ORTHOPAEDIC SURGERY

## 2023-12-20 NOTE — PROGRESS NOTES
History of present illness    68-year-old female exactly 1 year out from her left total knee replacement her surgery was December 19, 2022.  She states she is very happy with the result for the most part that he does very well she has got good flexibility she had been doing more activities than she was prior to the needed surgery she has a little bit of soreness along the lateral side that comes and goes when she was last here it was recommended she did do some additional physical therapy but she states that she never really got around to that her soreness along the lateral side of the knee no fevers or chills no numbness or tingling no locking or giving way      Past medical , Surgical, Family and social history reviewed.      Physical exam  General: No acute distress and breathing comfortably.  Patient is pleasant and cooperative with the examination.    Extremity  Left knee incision is well-healed without sign infection full knee extension flexion 105 degrees no instability brisk cap refill compartments are soft calf is nontender mild tenderness along the IT band laterally    Diagnostics      XR knee left 3 views    Result Date: 12/19/2023  Interpreted By:  Rosalina Lr, STUDY: XR KNEE LEFT 3 VIEWS;  ;  12/19/2023 4:28 pm   INDICATION: Signs/Symptoms:s/p TKR.   ACCESSION NUMBER(S): [id number]   ORDERING CLINICIAN: ROSALINA LR   FINDINGS: Three views of the knee show status post joint replacement in good alignment.  There are no signs of fracture or dislocation.  No other bony abnormalities       Signed by: Rosalina Lr 12/19/2023 4:29 PM Dictation workstation:   [id number]    OCT, Retina - OU - Both Eyes    Result Date: 12/11/2023  Right Eye Quality was good. Scan locations included subfoveal. Progression has improved. Findings include normal observations. Left Eye Quality was good. Scan locations included subfoveal. Progression has improved. Findings include normal  observations. Notes Retinal multimodal imaging including photography was completed, and the findings are described in the examination.    XR chest 2 views    Result Date: 11/30/2023  Interpreted By:  Russell Willard, STUDY: XR CHEST 2 VIEWS;  11/28/2023 9:55 am   INDICATION: Signs/Symptoms:cough x 3 months.   COMPARISON: 12/14/2018.   ACCESSION NUMBER(S): [id number]   ORDERING CLINICIAN: KOURTNEY SOLIS   FINDINGS:     CARDIOMEDIASTINAL SILHOUETTE: Cardiomediastinal silhouette is normal in size and configuration. There are calcified left hilar/mediastinal lymph nodes.   LUNGS: There is loculated left-sided pleural fluid/thickening. No acute airspace disease.   ABDOMEN: No remarkable upper abdominal findings.   BONES: No acute osseous changes.       1.  No acute airspace disease with chronic left-sided pleural effusion/thickening. 2. Calcified mediastinal/hilar lymph nodes consistent with remote granulomatous exposure. 3. If cough continues consider high-resolution CT scan.     Signed by: Russell Willard 11/30/2023 7:33 AM Dictation workstation:   [id number]       Procedure  [ none]    Assessment  Status post left total knee replacement with chronic IT band tendinitis    Treatment plan  1.  The natural history of the condition and its associated treatment alternatives including surgical and nonsurgical options were discussed with the patient at length.  2.  Reassured her structure everything looks okay as far as the knee replacement goes she is willing to do an additional round of physical therapy 1-2 visits to learn a home exercise program she is given a new referral she will follow-up with me if the knee gives her more trouble otherwise..  3. [   ]  4.  All of the patient's questions were answered.    This note was prepared using voice recognition software.  The details of this note are correct and have been reviewed, and corrected to the best of my ability.  Some grammatical areas may persist related to the  Dragon software    Zack Lr MD  Senior Attending Physician  [facility]  [facility] [place]    [phone]

## 2024-01-10 DIAGNOSIS — H30.21 INTERMEDIATE UVEITIS OF RIGHT EYE: Primary | ICD-10-CM

## 2024-01-10 RX ORDER — PREDNISONE 5 MG/1
TABLET ORAL
Qty: 14 TABLET | Refills: 0 | Status: SHIPPED | OUTPATIENT
Start: 2024-01-10 | End: 2024-01-17

## 2024-01-11 ENCOUNTER — PHARMACY VISIT (OUTPATIENT)
Dept: PHARMACY | Facility: CLINIC | Age: 69
End: 2024-01-11
Payer: COMMERCIAL

## 2024-01-11 PROCEDURE — RXMED WILLOW AMBULATORY MEDICATION CHARGE

## 2024-01-23 ENCOUNTER — APPOINTMENT (OUTPATIENT)
Dept: OPHTHALMOLOGY | Facility: CLINIC | Age: 69
End: 2024-01-23
Payer: COMMERCIAL

## 2024-01-26 ENCOUNTER — OFFICE VISIT (OUTPATIENT)
Dept: OPHTHALMOLOGY | Facility: CLINIC | Age: 69
End: 2024-01-26
Payer: COMMERCIAL

## 2024-01-26 DIAGNOSIS — H25.812 COMBINED FORM OF AGE-RELATED CATARACT, LEFT EYE: Primary | ICD-10-CM

## 2024-01-26 DIAGNOSIS — H26.491 PCO (POSTERIOR CAPSULAR OPACIFICATION), RIGHT: ICD-10-CM

## 2024-01-26 PROCEDURE — 92136 OPHTHALMIC BIOMETRY: CPT | Mod: BILATERAL PROCEDURE | Performed by: OPHTHALMOLOGY

## 2024-01-26 PROCEDURE — 66821 AFTER CATARACT LASER SURGERY: CPT | Mod: RIGHT SIDE | Performed by: OPHTHALMOLOGY

## 2024-01-26 PROCEDURE — 99214 OFFICE O/P EST MOD 30 MIN: CPT | Performed by: OPHTHALMOLOGY

## 2024-01-26 ASSESSMENT — TONOMETRY
OD_IOP_MMHG: 15
OS_IOP_MMHG: 15
IOP_METHOD: GOLDMAN APPLANATION

## 2024-01-26 ASSESSMENT — REFRACTION_MANIFEST
METHOD_AUTOREFRACTION: 1
OD_SPHERE: +1.25
OS_CYLINDER: -1.00
OD_AXIS: 115
OS_AXIS: 095
OD_CYLINDER: -1.00
OS_SPHERE: -0.25

## 2024-01-26 ASSESSMENT — CUP TO DISC RATIO
OD_RATIO: 0.3
OS_RATIO: 0.3

## 2024-01-26 ASSESSMENT — CONF VISUAL FIELD
OS_INFERIOR_NASAL_RESTRICTION: 0
OS_INFERIOR_TEMPORAL_RESTRICTION: 0
OS_SUPERIOR_TEMPORAL_RESTRICTION: 0
METHOD: COUNTING FINGERS
OS_NORMAL: 1
OS_SUPERIOR_NASAL_RESTRICTION: 0

## 2024-01-26 ASSESSMENT — SLIT LAMP EXAM - LIDS
COMMENTS: NORMAL
COMMENTS: NORMAL

## 2024-01-26 ASSESSMENT — VISUAL ACUITY
OS_BAT_HIGH: 20/40+2
OD_SC: 20/80-1
OS_SC: 20/30+2
METHOD: SNELLEN - LINEAR

## 2024-01-26 ASSESSMENT — EXTERNAL EXAM - LEFT EYE: OS_EXAM: NORMAL

## 2024-01-26 ASSESSMENT — EXTERNAL EXAM - RIGHT EYE: OD_EXAM: NORMAL

## 2024-01-26 ASSESSMENT — ENCOUNTER SYMPTOMS: EYES NEGATIVE: 1

## 2024-01-26 NOTE — PROGRESS NOTES
Assessment/Plan   Diagnoses and all orders for this visit:  Combined form of age-related cataract, left eye  Mildly Visually significant   Defer until after YAG OD and pt will reassess how bothersome the cataract OS is  -     IOL Biometry - OU - Both Eyes  PCO (posterior capsular opacification), right  -     YAG Capsulotomy - OD - Right Eye

## 2024-02-22 DIAGNOSIS — Z79.01 ANTICOAGULATION ADEQUATE WITH ANTICOAGULANT THERAPY: Primary | ICD-10-CM

## 2024-03-20 ENCOUNTER — TELEMEDICINE (OUTPATIENT)
Dept: PHARMACY | Facility: HOSPITAL | Age: 69
End: 2024-03-20
Payer: COMMERCIAL

## 2024-03-20 DIAGNOSIS — Z79.01 ANTICOAGULATION ADEQUATE WITH ANTICOAGULANT THERAPY: ICD-10-CM

## 2024-03-20 RX ORDER — CALCIUM CARB/VITAMIN D3/VIT K1 500MG-1000
TABLET,CHEWABLE ORAL
COMMUNITY

## 2024-03-20 RX ORDER — FERROUS SULFATE 325(65) MG
65 TABLET, DELAYED RELEASE (ENTERIC COATED) ORAL EVERY MORNING
COMMUNITY

## 2024-03-20 RX ORDER — CHOLECALCIFEROL (VITAMIN D3) 50 MCG
50 TABLET ORAL DAILY
COMMUNITY

## 2024-03-20 NOTE — PROGRESS NOTES
[facility] Pharmacist Visit - Medication Review  Astrid Ruffin is a 68 y.o. female was referred to Clinical Pharmacy Team for Comprehensive Medication Review.    Referring Provider: Leann Fishman, CARON-CNP    Subjective   Allergies   Allergen Reactions    Nyquil Hives    Shellfish Containing Products Hives       [facility]  [address], [address]  [facility] [address]  Phone: [phone] Fax: [phone]    [id number] - [address]  [address] [address]  Phone: [phone] Fax: [phone]    Social History     Social History Narrative    Not on file        Medication List  Current Outpatient Medications on File Prior to Visit   Medication Sig Dispense Refill    albuterol 90 mcg/actuation inhaler Inhale 2 puffs every 6 hours if needed for shortness of breath. 18 g 1    cholecalciferol (Vitamin D3) 50 MCG (2000 UT) tablet Take 1 tablet (50 mcg) by mouth once daily.      ferrous sulfate 325 (65 Fe) MG EC tablet Take 65 mg by mouth once daily in the morning. Do not crush, chew, or split.      losartan-hydrochlorothiazide (Hyzaar) 100-25 mg tablet Take 1 tablet by mouth once daily. 90 tablet 1    pediatric multivitamin no.76 (Flintstones Complete) tablet,chewable Chew.      rivaroxaban (Xarelto) 20 mg tablet TAKE 1 TABLET BY MOUTH ONCE DAILY 90 tablet 1    VITAMIN B COMPLEX ORAL Take by mouth.       No current facility-administered medications on file prior to visit.       Medication Reconciliation  Added:  Vitamin D3  Ferrous Sulfate  Vitamin B Complex  [place] chewable multivitamin  Removed: None  Changed: None    Drug Interactions  No significant drug interactions were noted.    HPI  PMH significant for Afib, CAD, HTN, SAPPHIRE, asthma, Hx bariatric surgery.  Special needs/barriers to therapy: None identified    Comprehensive Medication Review    Medication-Related Problem Discussion/Evaluation Action Plan   None N/A        Objective   Vitals  BP  "Readings from Last 2 Encounters:   11/22/23 102/78   07/21/23 99/65     BMI Readings from Last 1 Encounters:   11/22/23 38.66 kg/m²      Labs  A1C  Lab Results   Component Value Date    HGBA1C 5.3 10/19/2022     BMP  Lab Results   Component Value Date    CALCIUM 9.4 11/22/2023     11/22/2023    K 4.4 11/22/2023    CO2 28 11/22/2023     11/22/2023    BUN 14 11/22/2023    CREATININE 0.88 11/22/2023    GFRF 65 05/04/2023     LFTs  Lab Results   Component Value Date    ALT 10 11/22/2023    AST 14 11/22/2023    ALKPHOS 71 11/22/2023    BILITOT 0.4 11/22/2023     FLP  Lab Results   Component Value Date    TRIG 100 11/22/2023    CHOL 218 (H) 11/22/2023    LDLCALC 99 11/22/2023    HDL 98.7 11/22/2023     Urine Microalbumin  No results found for: \"ALBUR\", \"[id number]\"      Assessment/Plan   Problem List Items Addressed This Visit    None  Visit Diagnoses       Anticoagulation adequate with anticoagulant therapy              CMR: Reviewed and updated current medications. Patient denies any current medication-related problems. Patient adherent to all medications, no cost or access concerns, no adverse effects. No interventions needed at this time. Pharmacist available for any further questions or concerns.    Patient Education:  Counseled patient on relevant MOA, expectations, side effects, duration of therapy, contraindications, administration, and monitoring parameters.  All questions and concerns addressed. Contact pharmacist with any further questions or concerns prior to next appointment.    Clinical Pharmacist follow-up: As needed  Next PCP appointment: 5/22/24      Janeth Payton, PharmD  Clinical Pharmacist  03/20/24    Continue all meds under the continuation of care with the referring provider and clinical pharmacy team.  Verbal consent to manage patient's drug therapy was obtained from the patient. They were informed they may decline to participate or withdraw from participation in pharmacy services " at any time.

## 2024-04-24 DIAGNOSIS — Z00.6 ENCOUNTER FOR EXAMINATION FOR NORMAL COMPARISON AND CONTROL IN CLINICAL RESEARCH PROGRAM: ICD-10-CM

## 2024-05-09 ENCOUNTER — APPOINTMENT (OUTPATIENT)
Dept: RADIOLOGY | Facility: HOSPITAL | Age: 69
End: 2024-05-09

## 2024-05-17 DIAGNOSIS — Z12.31 ENCOUNTER FOR SCREENING MAMMOGRAM FOR BREAST CANCER: ICD-10-CM

## 2024-05-21 ENCOUNTER — HOSPITAL ENCOUNTER (OUTPATIENT)
Dept: RESPIRATORY THERAPY | Facility: HOSPITAL | Age: 69
Discharge: HOME | End: 2024-05-21
Payer: COMMERCIAL

## 2024-05-21 DIAGNOSIS — Z00.6 RESEARCH STUDY PATIENT: ICD-10-CM

## 2024-05-22 ENCOUNTER — APPOINTMENT (OUTPATIENT)
Dept: PRIMARY CARE | Facility: CLINIC | Age: 69
End: 2024-05-22
Payer: COMMERCIAL

## 2024-05-23 ENCOUNTER — HOSPITAL ENCOUNTER (OUTPATIENT)
Dept: RADIOLOGY | Facility: HOSPITAL | Age: 69
Discharge: HOME | End: 2024-05-23
Payer: COMMERCIAL

## 2024-05-23 VITALS — BODY MASS INDEX: 40.12 KG/M2 | HEIGHT: 64 IN | WEIGHT: 235 LBS

## 2024-05-23 DIAGNOSIS — Z12.31 ENCOUNTER FOR SCREENING MAMMOGRAM FOR BREAST CANCER: ICD-10-CM

## 2024-05-23 PROCEDURE — 77063 BREAST TOMOSYNTHESIS BI: CPT | Performed by: STUDENT IN AN ORGANIZED HEALTH CARE EDUCATION/TRAINING PROGRAM

## 2024-05-23 PROCEDURE — 77067 SCR MAMMO BI INCL CAD: CPT

## 2024-05-23 PROCEDURE — 77067 SCR MAMMO BI INCL CAD: CPT | Performed by: STUDENT IN AN ORGANIZED HEALTH CARE EDUCATION/TRAINING PROGRAM

## 2024-05-31 ENCOUNTER — OFFICE VISIT (OUTPATIENT)
Dept: PRIMARY CARE | Facility: CLINIC | Age: 69
End: 2024-05-31
Payer: COMMERCIAL

## 2024-05-31 ENCOUNTER — PHARMACY VISIT (OUTPATIENT)
Dept: PHARMACY | Facility: CLINIC | Age: 69
End: 2024-05-31
Payer: COMMERCIAL

## 2024-05-31 ENCOUNTER — HOSPITAL ENCOUNTER (OUTPATIENT)
Dept: RADIOLOGY | Facility: HOSPITAL | Age: 69
Discharge: HOME | End: 2024-05-31

## 2024-05-31 VITALS
HEIGHT: 64 IN | BODY MASS INDEX: 38.41 KG/M2 | TEMPERATURE: 97.5 F | HEART RATE: 78 BPM | DIASTOLIC BLOOD PRESSURE: 76 MMHG | OXYGEN SATURATION: 98 % | WEIGHT: 225 LBS | SYSTOLIC BLOOD PRESSURE: 118 MMHG

## 2024-05-31 DIAGNOSIS — Z79.01 ANTICOAGULATION ADEQUATE WITH ANTICOAGULANT THERAPY: ICD-10-CM

## 2024-05-31 DIAGNOSIS — D50.9 IRON DEFICIENCY ANEMIA, UNSPECIFIED IRON DEFICIENCY ANEMIA TYPE: ICD-10-CM

## 2024-05-31 DIAGNOSIS — Z12.11 ENCOUNTER FOR SCREENING FOR MALIGNANT NEOPLASM OF COLON: ICD-10-CM

## 2024-05-31 DIAGNOSIS — E66.09 CLASS 2 OBESITY DUE TO EXCESS CALORIES WITHOUT SERIOUS COMORBIDITY WITH BODY MASS INDEX (BMI) OF 38.0 TO 38.9 IN ADULT: ICD-10-CM

## 2024-05-31 DIAGNOSIS — Z00.6 ENCOUNTER FOR EXAMINATION FOR NORMAL COMPARISON AND CONTROL IN CLINICAL RESEARCH PROGRAM: ICD-10-CM

## 2024-05-31 DIAGNOSIS — I10 ESSENTIAL HYPERTENSION: Primary | ICD-10-CM

## 2024-05-31 DIAGNOSIS — I87.2 CHRONIC VENOUS INSUFFICIENCY: ICD-10-CM

## 2024-05-31 LAB
ANION GAP SERPL CALC-SCNC: 16 MMOL/L (ref 10–20)
BASOPHILS # BLD AUTO: 0.06 X10*3/UL (ref 0–0.1)
BASOPHILS NFR BLD AUTO: 1.1 %
BUN SERPL-MCNC: 19 MG/DL (ref 6–23)
CALCIUM SERPL-MCNC: 9.9 MG/DL (ref 8.6–10.6)
CHLORIDE SERPL-SCNC: 104 MMOL/L (ref 98–107)
CO2 SERPL-SCNC: 28 MMOL/L (ref 21–32)
CREAT SERPL-MCNC: 0.91 MG/DL (ref 0.5–1.05)
EGFRCR SERPLBLD CKD-EPI 2021: 69 ML/MIN/1.73M*2
EOSINOPHIL # BLD AUTO: 0.31 X10*3/UL (ref 0–0.7)
EOSINOPHIL NFR BLD AUTO: 5.5 %
ERYTHROCYTE [DISTWIDTH] IN BLOOD BY AUTOMATED COUNT: 13.2 % (ref 11.5–14.5)
FERRITIN SERPL-MCNC: 52 NG/ML (ref 8–150)
GLUCOSE SERPL-MCNC: 75 MG/DL (ref 74–99)
HCT VFR BLD AUTO: 41.8 % (ref 36–46)
HGB BLD-MCNC: 13.4 G/DL (ref 12–16)
IMM GRANULOCYTES # BLD AUTO: 0.01 X10*3/UL (ref 0–0.7)
IMM GRANULOCYTES NFR BLD AUTO: 0.2 % (ref 0–0.9)
IRON SATN MFR SERPL: 30 % (ref 25–45)
IRON SERPL-MCNC: 127 UG/DL (ref 35–150)
LYMPHOCYTES # BLD AUTO: 1.3 X10*3/UL (ref 1.2–4.8)
LYMPHOCYTES NFR BLD AUTO: 23.3 %
MCH RBC QN AUTO: 30.9 PG (ref 26–34)
MCHC RBC AUTO-ENTMCNC: 32.1 G/DL (ref 32–36)
MCV RBC AUTO: 96 FL (ref 80–100)
MONOCYTES # BLD AUTO: 0.56 X10*3/UL (ref 0.1–1)
MONOCYTES NFR BLD AUTO: 10 %
NEUTROPHILS # BLD AUTO: 3.35 X10*3/UL (ref 1.2–7.7)
NEUTROPHILS NFR BLD AUTO: 59.9 %
NRBC BLD-RTO: 0 /100 WBCS (ref 0–0)
PLATELET # BLD AUTO: 409 X10*3/UL (ref 150–450)
POTASSIUM SERPL-SCNC: 4.5 MMOL/L (ref 3.5–5.3)
RBC # BLD AUTO: 4.34 X10*6/UL (ref 4–5.2)
SODIUM SERPL-SCNC: 143 MMOL/L (ref 136–145)
TIBC SERPL-MCNC: 425 UG/DL (ref 240–445)
UIBC SERPL-MCNC: 298 UG/DL (ref 110–370)
WBC # BLD AUTO: 5.6 X10*3/UL (ref 4.4–11.3)

## 2024-05-31 PROCEDURE — 83540 ASSAY OF IRON: CPT

## 2024-05-31 PROCEDURE — 3074F SYST BP LT 130 MM HG: CPT | Performed by: NURSE PRACTITIONER

## 2024-05-31 PROCEDURE — 82728 ASSAY OF FERRITIN: CPT

## 2024-05-31 PROCEDURE — 1160F RVW MEDS BY RX/DR IN RCRD: CPT | Performed by: NURSE PRACTITIONER

## 2024-05-31 PROCEDURE — 36415 COLL VENOUS BLD VENIPUNCTURE: CPT

## 2024-05-31 PROCEDURE — 99214 OFFICE O/P EST MOD 30 MIN: CPT | Performed by: NURSE PRACTITIONER

## 2024-05-31 PROCEDURE — 80048 BASIC METABOLIC PNL TOTAL CA: CPT

## 2024-05-31 PROCEDURE — 1125F AMNT PAIN NOTED PAIN PRSNT: CPT | Performed by: NURSE PRACTITIONER

## 2024-05-31 PROCEDURE — 1159F MED LIST DOCD IN RCRD: CPT | Performed by: NURSE PRACTITIONER

## 2024-05-31 PROCEDURE — 71250 CT THORAX DX C-: CPT

## 2024-05-31 PROCEDURE — 3008F BODY MASS INDEX DOCD: CPT | Performed by: NURSE PRACTITIONER

## 2024-05-31 PROCEDURE — 83550 IRON BINDING TEST: CPT

## 2024-05-31 PROCEDURE — 1157F ADVNC CARE PLAN IN RCRD: CPT | Performed by: NURSE PRACTITIONER

## 2024-05-31 PROCEDURE — RXMED WILLOW AMBULATORY MEDICATION CHARGE

## 2024-05-31 PROCEDURE — 85025 COMPLETE CBC W/AUTO DIFF WBC: CPT

## 2024-05-31 PROCEDURE — 3078F DIAST BP <80 MM HG: CPT | Performed by: NURSE PRACTITIONER

## 2024-05-31 RX ORDER — LOSARTAN POTASSIUM AND HYDROCHLOROTHIAZIDE 25; 100 MG/1; MG/1
1 TABLET ORAL DAILY
Qty: 90 TABLET | Refills: 1 | Status: SHIPPED | OUTPATIENT
Start: 2024-05-31 | End: 2024-11-27

## 2024-05-31 ASSESSMENT — PATIENT HEALTH QUESTIONNAIRE - PHQ9
2. FEELING DOWN, DEPRESSED OR HOPELESS: NOT AT ALL
1. LITTLE INTEREST OR PLEASURE IN DOING THINGS: NOT AT ALL
SUM OF ALL RESPONSES TO PHQ9 QUESTIONS 1 AND 2: 0

## 2024-05-31 ASSESSMENT — ENCOUNTER SYMPTOMS
DEPRESSION: 0
OCCASIONAL FEELINGS OF UNSTEADINESS: 0
LOSS OF SENSATION IN FEET: 0

## 2024-05-31 ASSESSMENT — PAIN SCALES - GENERAL: PAINLEVEL: 1

## 2024-05-31 NOTE — PROGRESS NOTES
"Subjective   Patient ID: Astrid Travis is a 69 y.o. female who presents for Hypertension and Follow-up    Presents for BP follow up  Does not check at home  Denies any HA, CP, SOB, visual changes    Needs refills today  Tolerating Xarelto well    Has noted new onset allergies this month, not currently treating.  Has needed albuterol once daily, feels better after use.        Review of Systems    Objective     /76 (BP Location: Left arm, Patient Position: Sitting, BP Cuff Size: Large adult)   Pulse 78   Temp 36.4 °C (97.5 °F) (Temporal)   Ht 1.626 m (5' 4\")   Wt 102 kg (225 lb)   SpO2 98%   BMI 38.62 kg/m²      Physical Exam  Vitals and nursing note reviewed.   Constitutional:       General: She is not in acute distress.     Appearance: Normal appearance.   HENT:      Head: Normocephalic.      Right Ear: Tympanic membrane, ear canal and external ear normal.      Left Ear: Tympanic membrane, ear canal and external ear normal.      Nose: Congestion and rhinorrhea present.      Mouth/Throat:      Mouth: Mucous membranes are moist.      Pharynx: Posterior oropharyngeal erythema present.   Eyes:      Conjunctiva/sclera: Conjunctivae normal.   Cardiovascular:      Rate and Rhythm: Normal rate and regular rhythm.      Pulses: Normal pulses.      Heart sounds: Normal heart sounds. No murmur heard.  Pulmonary:      Effort: Pulmonary effort is normal. No respiratory distress.      Breath sounds: Normal breath sounds.   Musculoskeletal:      Right lower leg: Edema present.      Left lower leg: Edema present.      Comments: Wearing compression socks   Lymphadenopathy:      Cervical: No cervical adenopathy.   Psychiatric:         Mood and Affect: Mood normal.         Assessment/Plan   Diagnoses and all orders for this visit:  Essential hypertension  -     losartan-hydrochlorothiazide (Hyzaar) 100-25 mg tablet; Take 1 tablet by mouth once daily.  Anticoagulation adequate with anticoagulant therapy  -     rivaroxaban " (Xarelto) 20 mg tablet; TAKE 1 TABLET BY MOUTH ONCE DAILY  Chronic venous insufficiency  Encounter for screening for malignant neoplasm of colon  -     Cologuard® colon cancer screening; Future  Iron deficiency anemia, unspecified iron deficiency anemia type  -     Ferritin  -     Basic metabolic panel  -     CBC and Auto Differential  -     Iron and TIBC; Future  Class 2 obesity due to excess calories without serious comorbidity with body mass index (BMI) of 38.0 to 38.9 in adult      Patient Instructions   HTN:  Good control on losartan/hydrochlorothiazide  Continue current medications as prescribed.  Follow a low salt diet  Check blood pressure at home  Goal BP less than 135/85  Recommend regular aerobic exercise at least 30 minutes a day at least 5 days a week for cardiovascular health  Maintain a healthy weight    Continue iron daily  Check follow up blood work today    Continue Xarelto daily    Cologuard ordered for colon cancer screening.    Allergic trigger for asthma - continue to use albuterol as needed  Add daily OTC allergy medication, such as Zyrtec or Xyzal for allergy control  Avoid triggers as possible    Follow up in 6 months for evaluation of therapy

## 2024-05-31 NOTE — PATIENT INSTRUCTIONS
HTN:  Good control on losartan/hydrochlorothiazide  Continue current medications as prescribed.  Follow a low salt diet  Check blood pressure at home  Goal BP less than 135/85  Recommend regular aerobic exercise at least 30 minutes a day at least 5 days a week for cardiovascular health  Maintain a healthy weight    Continue iron daily  Check follow up blood work today    Continue Xarelto daily    Cologuard ordered for colon cancer screening.    Allergic trigger for asthma - continue to use albuterol as needed  Add daily OTC allergy medication, such as Zyrtec or Xyzal for allergy control  Avoid triggers as possible    Follow up in 6 months for evaluation of therapy

## 2024-06-12 LAB — NONINV COLON CA DNA+OCC BLD SCRN STL QL: NEGATIVE

## 2024-06-14 ENCOUNTER — APPOINTMENT (OUTPATIENT)
Dept: OPHTHALMOLOGY | Facility: CLINIC | Age: 69
End: 2024-06-14
Payer: COMMERCIAL

## 2024-07-10 ENCOUNTER — DOCUMENTATION (OUTPATIENT)
Dept: IMMUNOLOGY | Facility: CLINIC | Age: 69
End: 2024-07-10
Payer: COMMERCIAL

## 2024-07-10 NOTE — PROGRESS NOTES
Spoke with participant regarding the results of the Chest CT done as part of the Recover Covid study.   She will follow up with her PCP.

## 2024-07-12 ENCOUNTER — APPOINTMENT (OUTPATIENT)
Dept: OPHTHALMOLOGY | Facility: CLINIC | Age: 69
End: 2024-07-12
Payer: COMMERCIAL

## 2024-07-12 DIAGNOSIS — H52.12 MYOPIA OF LEFT EYE WITH ASTIGMATISM AND PRESBYOPIA: ICD-10-CM

## 2024-07-12 DIAGNOSIS — H52.4 MYOPIA OF LEFT EYE WITH ASTIGMATISM AND PRESBYOPIA: ICD-10-CM

## 2024-07-12 DIAGNOSIS — H30.21 INTERMEDIATE UVEITIS OF RIGHT EYE: ICD-10-CM

## 2024-07-12 DIAGNOSIS — H20.9 UVEITIS: Primary | ICD-10-CM

## 2024-07-12 DIAGNOSIS — H20.9 IRIDOCYCLITIS, RIGHT EYE: ICD-10-CM

## 2024-07-12 DIAGNOSIS — H52.202 MYOPIA OF LEFT EYE WITH ASTIGMATISM AND PRESBYOPIA: ICD-10-CM

## 2024-07-12 DIAGNOSIS — Z96.1 PSEUDOPHAKIA OF RIGHT EYE: ICD-10-CM

## 2024-07-12 ASSESSMENT — CUP TO DISC RATIO
OD_RATIO: 0.3
OS_RATIO: 0.3

## 2024-07-12 ASSESSMENT — VISUAL ACUITY
OS_SC: 20/20-2
METHOD: SNELLEN - LINEAR
OD_SC: 20/25+2

## 2024-07-12 ASSESSMENT — EXTERNAL EXAM - LEFT EYE: OS_EXAM: NORMAL

## 2024-07-12 ASSESSMENT — SLIT LAMP EXAM - LIDS
COMMENTS: NORMAL
COMMENTS: NORMAL

## 2024-07-12 ASSESSMENT — TONOMETRY
OS_IOP_MMHG: 14
OD_IOP_MMHG: 14
IOP_METHOD: GOLDMANN APPLANATION

## 2024-07-12 ASSESSMENT — EXTERNAL EXAM - RIGHT EYE: OD_EXAM: NORMAL

## 2024-07-12 NOTE — PROGRESS NOTES
Assessment/Plan   Diagnoses and all orders for this visit:  Uveitis  -     OCT, Retina - OU - Both Eyes  Intermediate uveitis of right eye  Iridocyclitis, right eye  Myopia of left eye with astigmatism and presbyopia  Pseudophakia of right eye            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.9 Iridocyclitis, right eye-Improving  2 H25.812 Combined form of age-related cataract, left eye-Stable  3 H52.01 Hyperopia of right eye with astigmatism and presbyopia-Stable  4 H52.12 Myopia of left eye with astigmatism and presbyopia-Stable  5 Z96.1 Pseudophakia of right eye-Stable      1 Anterior uveitis OD non granulomatous improving onPF BID  hx of HSV positive  Off PF  Continue another r week then taper francoise 1 week    3 week total  Get Yag now    now f/u after  months  no uveitis   on no drops currently         Hi quality OCT  scans obtained  signal good    OCT OD - Normal Foveal Contour, No Edema, IS/OS Junction Normal  OCT OS - Normal Foveal Contour, No Edema, IS/OS Junction Normal    additional commnents:      Fu 6m

## 2024-07-19 DIAGNOSIS — Z00.6 RESEARCH EXAM: Primary | ICD-10-CM

## 2024-07-29 ENCOUNTER — LAB (OUTPATIENT)
Dept: LAB | Facility: LAB | Age: 69
End: 2024-07-29
Payer: COMMERCIAL

## 2024-07-29 DIAGNOSIS — Z00.6 RESEARCH STUDY PATIENT: ICD-10-CM

## 2024-07-29 DIAGNOSIS — Z00.6 RESEARCH EXAM: Primary | ICD-10-CM

## 2024-07-29 DIAGNOSIS — Z00.6 RESEARCH EXAM: ICD-10-CM

## 2024-07-29 LAB
CREAT UR-MCNC: 152 MG/DL (ref 20–320)
MICROALBUMIN UR-MCNC: 16.2 MG/L
MICROALBUMIN/CREAT UR: 10.7 UG/MG CREAT

## 2024-07-29 PROCEDURE — 82570 ASSAY OF URINE CREATININE: CPT

## 2024-07-29 PROCEDURE — 82043 UR ALBUMIN QUANTITATIVE: CPT

## 2024-07-31 ENCOUNTER — LAB (OUTPATIENT)
Dept: LAB | Facility: LAB | Age: 69
End: 2024-07-31
Payer: COMMERCIAL

## 2024-07-31 DIAGNOSIS — Z00.6 RESEARCH EXAM: Primary | ICD-10-CM

## 2024-07-31 DIAGNOSIS — Z00.6 RESEARCH STUDY PATIENT: ICD-10-CM

## 2024-07-31 DIAGNOSIS — Z00.6 RESEARCH EXAM: ICD-10-CM

## 2024-07-31 LAB
CREAT UR-MCNC: 109.3 MG/DL (ref 20–320)
MICROALBUMIN UR-MCNC: <7 MG/L
MICROALBUMIN/CREAT UR: NORMAL MG/G{CREAT}

## 2024-07-31 PROCEDURE — 82570 ASSAY OF URINE CREATININE: CPT

## 2024-07-31 PROCEDURE — 82043 UR ALBUMIN QUANTITATIVE: CPT

## 2024-08-20 ENCOUNTER — LAB (OUTPATIENT)
Dept: LAB | Facility: LAB | Age: 69
End: 2024-08-20
Payer: COMMERCIAL

## 2024-08-20 DIAGNOSIS — Z00.6 RESEARCH EXAM: ICD-10-CM

## 2024-08-20 LAB
CREAT SERPL-MCNC: 0.98 MG/DL (ref 0.5–1.05)
EGFRCR SERPLBLD CKD-EPI 2021: 63 ML/MIN/1.73M*2
HCT VFR BLD AUTO: 41.8 % (ref 36–46)

## 2024-08-20 PROCEDURE — 82565 ASSAY OF CREATININE: CPT

## 2024-08-20 PROCEDURE — 85014 HEMATOCRIT: CPT

## 2024-08-20 PROCEDURE — 36415 COLL VENOUS BLD VENIPUNCTURE: CPT

## 2024-08-22 ENCOUNTER — APPOINTMENT (OUTPATIENT)
Dept: LAB | Facility: LAB | Age: 69
End: 2024-08-22
Payer: COMMERCIAL

## 2024-08-22 DIAGNOSIS — Z00.6 RESEARCH EXAM: Primary | ICD-10-CM

## 2024-08-22 PROCEDURE — RXMED WILLOW AMBULATORY MEDICATION CHARGE

## 2024-08-23 ENCOUNTER — APPOINTMENT (OUTPATIENT)
Dept: LAB | Facility: LAB | Age: 69
End: 2024-08-23
Payer: COMMERCIAL

## 2024-08-23 PROCEDURE — RXMED WILLOW AMBULATORY MEDICATION CHARGE

## 2024-08-24 ENCOUNTER — PHARMACY VISIT (OUTPATIENT)
Dept: PHARMACY | Facility: CLINIC | Age: 69
End: 2024-08-24
Payer: COMMERCIAL

## 2024-08-28 ENCOUNTER — PHARMACY VISIT (OUTPATIENT)
Dept: PHARMACY | Facility: CLINIC | Age: 69
End: 2024-08-28
Payer: COMMERCIAL

## 2024-09-23 ENCOUNTER — APPOINTMENT (OUTPATIENT)
Dept: PRIMARY CARE | Facility: CLINIC | Age: 69
End: 2024-09-23
Payer: COMMERCIAL

## 2024-09-23 VITALS
SYSTOLIC BLOOD PRESSURE: 122 MMHG | BODY MASS INDEX: 38.62 KG/M2 | TEMPERATURE: 97.5 F | OXYGEN SATURATION: 99 % | WEIGHT: 225 LBS | HEART RATE: 78 BPM | DIASTOLIC BLOOD PRESSURE: 74 MMHG

## 2024-09-23 DIAGNOSIS — M65.4 TENDINITIS, DE QUERVAIN'S: Primary | ICD-10-CM

## 2024-09-23 RX ORDER — PREDNISONE 5 MG/1
TABLET ORAL
Qty: 14 TABLET | Refills: 0 | Status: SHIPPED | OUTPATIENT
Start: 2024-09-23 | End: 2024-09-30

## 2024-09-23 ASSESSMENT — PAIN SCALES - GENERAL: PAINLEVEL: 5

## 2024-09-23 NOTE — PATIENT INSTRUCTIONS
Prednisone as directed  Given hand out with PT exercises to do as tolerated  Ice area 2-3 times daily  Avoid overuse as able  Try topical Voltaren gel to area  Follow up with any persisting symptoms  Consider Ortho hand follow up if not improving

## 2024-09-23 NOTE — PROGRESS NOTES
Subjective   Patient ID: Astrid Travis is a 69 y.o. female who presents for Arm Pain    Presents for right arm pain x 6 weeks  Concerned inflammatory reaction from Shingles vaccination.  Denies any injury    8/2 had shingles shot --> flared right eye uveitis    After getting 2nd shingles shot, 7 days later got body aches, pain  Initially resolved with prednisone  Right arm ongoing since then  Denies any numbness of fingers  Pain shoots up arm to elbow when having thumb pain      Review of Systems    Objective     /74 (BP Location: Right arm, Patient Position: Sitting, BP Cuff Size: Large adult)   Pulse 78   Temp 36.4 °C (97.5 °F) (Temporal)   Wt 102 kg (225 lb)   SpO2 99%   BMI 38.62 kg/m²      Physical Exam  Vitals and nursing note reviewed.   Constitutional:       General: She is not in acute distress.     Appearance: Normal appearance.   Musculoskeletal:      Right elbow: Normal.      Left elbow: Normal.      Right forearm: Normal.      Left forearm: Normal.      Right wrist: Swelling (mild) present. Normal range of motion.      Left wrist: Normal.      Right hand: Swelling (mild radial) and tenderness present. Decreased range of motion (1st digit). Normal capillary refill. Normal pulse.      Left hand: Normal.   Skin:     Capillary Refill: Capillary refill takes less than 2 seconds.          Assessment/Plan   Diagnoses and all orders for this visit:  Tendinitis, de Quervain's  -     predniSONE (Deltasone) 5 mg tablet; Take 4 tablets (20 mg) by mouth once daily for 2 days, THEN 2 tablets (10 mg) once daily for 2 days, THEN 1 tablet (5 mg) once daily for 2 days.      Patient Instructions   Prednisone as directed  Given hand out with PT exercises to do as tolerated  Ice area 2-3 times daily  Avoid overuse as able  Try topical Voltaren gel to area  Follow up with any persisting symptoms  Consider Ortho hand follow up if not improving

## 2024-09-24 ENCOUNTER — PHARMACY VISIT (OUTPATIENT)
Dept: PHARMACY | Facility: CLINIC | Age: 69
End: 2024-09-24
Payer: COMMERCIAL

## 2024-09-24 PROCEDURE — RXMED WILLOW AMBULATORY MEDICATION CHARGE

## 2024-10-03 ENCOUNTER — OFFICE VISIT (OUTPATIENT)
Dept: URGENT CARE | Age: 69
End: 2024-10-03
Payer: COMMERCIAL

## 2024-10-03 VITALS
RESPIRATION RATE: 16 BRPM | DIASTOLIC BLOOD PRESSURE: 70 MMHG | OXYGEN SATURATION: 98 % | SYSTOLIC BLOOD PRESSURE: 104 MMHG | TEMPERATURE: 98.2 F | HEART RATE: 72 BPM

## 2024-10-03 DIAGNOSIS — R05.9 COUGH, UNSPECIFIED TYPE: ICD-10-CM

## 2024-10-03 DIAGNOSIS — R06.2 WHEEZING: Primary | ICD-10-CM

## 2024-10-03 LAB — POC SARS-COV-2 AG BINAX: NORMAL

## 2024-10-03 RX ORDER — DOXYCYCLINE 100 MG/1
100 CAPSULE ORAL 2 TIMES DAILY
Qty: 20 CAPSULE | Refills: 0 | Status: SHIPPED | OUTPATIENT
Start: 2024-10-03 | End: 2024-10-13

## 2024-10-03 RX ORDER — PREDNISONE 20 MG/1
20 TABLET ORAL DAILY
Qty: 5 TABLET | Refills: 0 | Status: SHIPPED | OUTPATIENT
Start: 2024-10-03 | End: 2024-10-08

## 2024-10-03 RX ORDER — HYDROCODONE BITARTRATE AND HOMATROPINE METHYLBROMIDE ORAL SOLUTION 5; 1.5 MG/5ML; MG/5ML
5 LIQUID ORAL NIGHTLY
Qty: 35 ML | Refills: 0 | Status: SHIPPED | OUTPATIENT
Start: 2024-10-03 | End: 2024-10-10

## 2024-10-03 NOTE — PROGRESS NOTES
HPI:  Patient states that for the past 5 days she had dry cough with occasional sputum production.  Some chills.  No CP.  No fever.  No congestion.  Occasional SOB from the cough.   Pt states that she was taking OTC meds with minimal relief.  Cough is keeping her up at night.  Pt states that she was using Albuterol inhaler with some relief.  Pt states that she had bronchitis in the past and symptoms feel the same.         ROS:  No fever  No CP  No HA  No n/v/diarrhea    PE:    A&O x3  NCAT  PERRLA, EOMI  TM clear bl  +pnd  RRR  Coarse breath sounds with expiratory wheezes  MOEx4  No focal deficit  Judgement normal  No submandibular nodes    Results:  Covid-    A/P:   Cough  Wheezing    Increase fluids.  Rest.  Eat yogurt and take probiotics when on medication.  Continue with albuterol as needed.  Tylenol as needed for pain.  Keep a diary of symptoms.  Recheck with your doctor in a week if no improvement. Go to the ER if starts getting worse.

## 2024-10-15 ENCOUNTER — APPOINTMENT (OUTPATIENT)
Dept: LAB | Facility: LAB | Age: 69
End: 2024-10-15
Payer: COMMERCIAL

## 2024-10-15 ENCOUNTER — APPOINTMENT (OUTPATIENT)
Dept: RESEARCH | Facility: HOSPITAL | Age: 69
End: 2024-10-15
Payer: COMMERCIAL

## 2024-10-15 DIAGNOSIS — Z00.6 RESEARCH STUDY PATIENT: Primary | ICD-10-CM

## 2024-10-15 LAB
25(OH)D3 SERPL-MCNC: 36 NG/ML (ref 30–100)
APTT PPP: 39 SECONDS (ref 27–38)
BASOPHILS # BLD AUTO: 0.06 X10*3/UL (ref 0–0.1)
BASOPHILS NFR BLD AUTO: 1 %
EOSINOPHIL # BLD AUTO: 0.27 X10*3/UL (ref 0–0.7)
EOSINOPHIL NFR BLD AUTO: 4.5 %
ERYTHROCYTE [DISTWIDTH] IN BLOOD BY AUTOMATED COUNT: 12.4 % (ref 11.5–14.5)
EST. AVERAGE GLUCOSE BLD GHB EST-MCNC: 108 MG/DL
HBA1C MFR BLD: 5.4 %
HCT VFR BLD AUTO: 43.7 % (ref 36–46)
HGB BLD-MCNC: 14.2 G/DL (ref 12–16)
IMM GRANULOCYTES # BLD AUTO: 0.02 X10*3/UL (ref 0–0.7)
IMM GRANULOCYTES NFR BLD AUTO: 0.3 % (ref 0–0.9)
INR PPP: 1.5 (ref 0.9–1.1)
LYMPHOCYTES # BLD AUTO: 1.49 X10*3/UL (ref 1.2–4.8)
LYMPHOCYTES NFR BLD AUTO: 24.7 %
MCH RBC QN AUTO: 31.2 PG (ref 26–34)
MCHC RBC AUTO-ENTMCNC: 32.5 G/DL (ref 32–36)
MCV RBC AUTO: 96 FL (ref 80–100)
MONOCYTES # BLD AUTO: 0.56 X10*3/UL (ref 0.1–1)
MONOCYTES NFR BLD AUTO: 9.3 %
NEUTROPHILS # BLD AUTO: 3.63 X10*3/UL (ref 1.2–7.7)
NEUTROPHILS NFR BLD AUTO: 60.2 %
NRBC BLD-RTO: 0 /100 WBCS (ref 0–0)
PLATELET # BLD AUTO: 426 X10*3/UL (ref 150–450)
PROTHROMBIN TIME: 17 SECONDS (ref 9.8–12.8)
RBC # BLD AUTO: 4.55 X10*6/UL (ref 4–5.2)
WBC # BLD AUTO: 6 X10*3/UL (ref 4.4–11.3)

## 2024-10-15 PROCEDURE — 83036 HEMOGLOBIN GLYCOSYLATED A1C: CPT

## 2024-10-15 PROCEDURE — 85610 PROTHROMBIN TIME: CPT

## 2024-10-15 PROCEDURE — 82306 VITAMIN D 25 HYDROXY: CPT

## 2024-10-15 PROCEDURE — 85025 COMPLETE CBC W/AUTO DIFF WBC: CPT

## 2024-10-15 PROCEDURE — 85730 THROMBOPLASTIN TIME PARTIAL: CPT

## 2024-10-15 PROCEDURE — 36415 COLL VENOUS BLD VENIPUNCTURE: CPT

## 2024-11-20 DIAGNOSIS — Z79.01 ANTICOAGULATION ADEQUATE WITH ANTICOAGULANT THERAPY: ICD-10-CM

## 2024-11-20 DIAGNOSIS — I10 ESSENTIAL HYPERTENSION: ICD-10-CM

## 2024-11-20 PROCEDURE — RXMED WILLOW AMBULATORY MEDICATION CHARGE

## 2024-11-20 RX ORDER — LOSARTAN POTASSIUM AND HYDROCHLOROTHIAZIDE 25; 100 MG/1; MG/1
1 TABLET ORAL DAILY
Qty: 90 TABLET | Refills: 1 | Status: SHIPPED | OUTPATIENT
Start: 2024-11-20 | End: 2025-05-19

## 2024-11-22 ENCOUNTER — PHARMACY VISIT (OUTPATIENT)
Dept: PHARMACY | Facility: CLINIC | Age: 69
End: 2024-11-22
Payer: COMMERCIAL

## 2024-12-06 ENCOUNTER — APPOINTMENT (OUTPATIENT)
Dept: PRIMARY CARE | Facility: CLINIC | Age: 69
End: 2024-12-06
Payer: COMMERCIAL

## 2024-12-09 ENCOUNTER — APPOINTMENT (OUTPATIENT)
Dept: PRIMARY CARE | Facility: CLINIC | Age: 69
End: 2024-12-09
Payer: COMMERCIAL

## 2024-12-09 VITALS
DIASTOLIC BLOOD PRESSURE: 70 MMHG | OXYGEN SATURATION: 98 % | SYSTOLIC BLOOD PRESSURE: 118 MMHG | HEIGHT: 64 IN | TEMPERATURE: 97.6 F | WEIGHT: 240 LBS | BODY MASS INDEX: 40.97 KG/M2 | HEART RATE: 74 BPM

## 2024-12-09 DIAGNOSIS — I10 ESSENTIAL HYPERTENSION: Primary | ICD-10-CM

## 2024-12-09 DIAGNOSIS — M25.531 RIGHT WRIST PAIN: ICD-10-CM

## 2024-12-09 PROCEDURE — 99213 OFFICE O/P EST LOW 20 MIN: CPT | Performed by: NURSE PRACTITIONER

## 2024-12-09 PROCEDURE — 3078F DIAST BP <80 MM HG: CPT | Performed by: NURSE PRACTITIONER

## 2024-12-09 PROCEDURE — 3008F BODY MASS INDEX DOCD: CPT | Performed by: NURSE PRACTITIONER

## 2024-12-09 PROCEDURE — 3074F SYST BP LT 130 MM HG: CPT | Performed by: NURSE PRACTITIONER

## 2024-12-09 PROCEDURE — 1160F RVW MEDS BY RX/DR IN RCRD: CPT | Performed by: NURSE PRACTITIONER

## 2024-12-09 PROCEDURE — 1159F MED LIST DOCD IN RCRD: CPT | Performed by: NURSE PRACTITIONER

## 2024-12-09 PROCEDURE — 1157F ADVNC CARE PLAN IN RCRD: CPT | Performed by: NURSE PRACTITIONER

## 2024-12-09 PROCEDURE — 1125F AMNT PAIN NOTED PAIN PRSNT: CPT | Performed by: NURSE PRACTITIONER

## 2024-12-09 RX ORDER — OXYCODONE AND ACETAMINOPHEN 5; 325 MG/1; MG/1
1 TABLET ORAL EVERY 6 HOURS PRN
Qty: 5 TABLET | Refills: 0 | Status: SHIPPED | OUTPATIENT
Start: 2024-12-09 | End: 2024-12-16

## 2024-12-09 ASSESSMENT — PAIN SCALES - GENERAL: PAINLEVEL_OUTOF10: 4

## 2024-12-09 NOTE — PATIENT INSTRUCTIONS
BP well controlled on current medications  Continue current medications as prescribed.  Follow a low salt diet  Check blood pressure at home  Goal BP less than 135/85  Recommend regular aerobic exercise at least 30 minutes a day at least 5 days a week for cardiovascular health  Maintain a healthy weight    Wrist pain: Referral to Orthopedic hand - Dr Forbes or Dr Eladio Spicer to use as needed  Discussed gabapentin, declines    Continue to work on weight reduction  Discussed trying Wegovy or Zepbound next year, if covered on new insurance    Follow up in 6 months for evaluation of therapy

## 2024-12-09 NOTE — PROGRESS NOTES
"Subjective   Patient ID: Astrid Travis is a 69 y.o. female who presents for Hypertension    BP follow up  Tolerating medications well  Denies any HA, CP, SOB.    Presents for right wrist pain, worsening  Has been wearing wrist brace on and off  Tylenol, Voltaren used  Unable to use right hand  Variable with no pain to 10/10  Can be triggered with pressure to area,   Pain is shooting, hit top of hand to forearm  No injury  Prednisone did not nothing for symptoms    Shingles after vaccine - never went away  Wrist still aggravated    Has gained 12# - unable to cook due to wrist pain      Review of Systems    Objective     /70 (BP Location: Right arm, Patient Position: Sitting, BP Cuff Size: Large adult)   Pulse 74   Temp 36.4 °C (97.6 °F) (Temporal)   Ht 1.626 m (5' 4\")   Wt 109 kg (240 lb)   SpO2 98%   BMI 41.20 kg/m²      Physical Exam  Vitals and nursing note reviewed.   Constitutional:       General: She is in acute distress (uncomfortable).      Appearance: She is obese.   Cardiovascular:      Rate and Rhythm: Normal rate and regular rhythm.      Pulses: Normal pulses.      Heart sounds: Normal heart sounds.   Pulmonary:      Effort: Pulmonary effort is normal. No respiratory distress.      Breath sounds: Normal breath sounds.   Musculoskeletal:      Right forearm: Swelling (mild distal swelling) present. No bony tenderness.      Right wrist: Swelling, tenderness and bony tenderness present. No crepitus. Decreased range of motion. Normal pulse.      Right hand: No bony tenderness. Decreased strength.          Assessment/Plan   Diagnoses and all orders for this visit:  Essential hypertension  Right wrist pain  -     oxyCODONE-acetaminophen (Percocet) 5-325 mg tablet; Take 1 tablet by mouth every 6 hours if needed for severe pain (7 - 10) for up to 7 days.  -     Referral to Orthopaedic Surgery; Future      Patient Instructions   BP well controlled on current medications  Continue current medications " as prescribed.  Follow a low salt diet  Check blood pressure at home  Goal BP less than 135/85  Recommend regular aerobic exercise at least 30 minutes a day at least 5 days a week for cardiovascular health  Maintain a healthy weight    Wrist pain: Referral to Orthopedic hand - Dr Forbes or Dr Eladio Spicer to use as needed  Discussed gabapentin, declines    Continue to work on weight reduction  Discussed trying Wegovy or Zepbound next year, if covered on new insurance    Follow up in 6 months for evaluation of therapy

## 2024-12-20 NOTE — PROGRESS NOTES
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 Symptoms began after her second shingles vaccination shot in late August. She saw her primary care physician who diagnosed her with the De Quervain tenosynovitis. She has intermittently worn in a cock-up wrist race,  which wasn't helping that much and caused some skin breakout on her palm. She has tried topical analgesics,  OTC anti-inflammatory medication,  and a short course of oral steroids without any significant improvement of the symptoms. Her symptoms fluctuate and seem to be aggravated by repetitive and forceful thumb and wrist maneuvers.        Please refer to New Patient Intake Form scanned into patient's electronic record for self reported past medical history, past surgical history, medications, allergies, family history, social history and 10 point review of systems    Examination:  Constitutional: Oriented to person, place, and time.  Appears well-developed and well-nourished.  Head: Normocephalic and atraumatic.  Eyes: Pupils are equal, round, and reactive to light.  Cardiovascular: Intact distal pulses.  Pulmonary/Chest/Breast: Effort normal. No respiratory distress.  Neurological: Alert and oriented to person, place, and time.  Skin: Skin is warm and dry.  Psychiatric: normal mood and affect.  Behavior is normal.  Musculoskeletal: Examination today reveals normal appearance of the right wrist. She has some mild swelling over the radial styloid, focal tenderness over the radial styloid with a markedly positive Finkelstein maneuver, minimal discomfort over the thumb CMC joint.         Personal Interpretation of Diagnostic studies: X rays of the  right wrist demonstrates some bony changes around the first dorsal compartment and radial styloid, but otherwise normal findings.      Impression:  De Quervain tenosynovitis       Plan: We have discussed the diagnosis and treatment options we're gonna provide her with a more appropriate thumb spike splint to wear at night time while sleeping and as needed during the day, she has agreed to proceed with steroid injection into the right wrist today as well. She had a right wrist first dorsal compartment injection and then she will follow up with me as needed if she has recurrence ever symptoms in the future      In Office Procedures Performed: She had a right wrist first dorsal compartment injection   Hand / UE Inj/Asp: R extensor compartment 1 for de Quervain's tenosynovitis on 12/24/2024 12:04 PM  Indications: tendon swelling and pain  Details: 25 G needle, radial approach  Medications: 20 mg triamcinolone acetonide 40 mg/mL; 0.5 mL lidocaine 10 mg/mL (1 %)  Outcome: tolerated well, no immediate complications  Procedure, treatment alternatives, risks and benefits explained, specific risks discussed. Immediately prior to procedure a time out was called to verify the correct patient, procedure, equipment, support staff and site/side marked as required. Patient was prepped and draped in the usual sterile fashion.         Patient was prescribed a thumb spica splint for deQuervain tenosynovitis. The patient has weakness, instability and/or deformity of their right wrist which requires stabilization from this orthosis to improve their function.      Verbal and written instructions for the use, wear schedule, cleaning and application of this item were given.  Patient was instructed that should the brace result in increased pain, decreased sensation, increased swelling, or an overall worsening of their medical condition, to please contact our office immediately.     Orthotic management and training was provided for skin  care, modifications due to healing tissues, edema changes, interruption in skin integrity, and safety precautions with the orthosis.        Follow up: PRN basis if she has recurrence ever symptoms in the future       Jaylan Forbes MD  [facility]  Department of Orthopaedic Surgery  Hand and Upper Extremity Reconstruction      Scribe Attestation  By signing my name below, Linda FINE Scribe   attest that this documentation has been prepared under the direction and in the presence of Dr. Jaylan Forbes.    Scribe Attestation  By signing my name below, Huyen FINE Scribe   attest that this documentation has been prepared under the direction and in the presence of Jaylan Forbes MD.      Dictation performed with the use of voice recognition software.  Syntax and grammatical errors may exist.

## 2024-12-24 ENCOUNTER — HOSPITAL ENCOUNTER (OUTPATIENT)
Dept: RADIOLOGY | Facility: HOSPITAL | Age: 69
Discharge: HOME | End: 2024-12-24
Payer: COMMERCIAL

## 2024-12-24 ENCOUNTER — OFFICE VISIT (OUTPATIENT)
Dept: ORTHOPEDIC SURGERY | Facility: HOSPITAL | Age: 69
End: 2024-12-24
Payer: COMMERCIAL

## 2024-12-24 VITALS — HEIGHT: 64 IN | WEIGHT: 240 LBS | BODY MASS INDEX: 40.97 KG/M2

## 2024-12-24 DIAGNOSIS — M25.531 RIGHT WRIST PAIN: ICD-10-CM

## 2024-12-24 DIAGNOSIS — M25.531 WRIST PAIN, RIGHT: ICD-10-CM

## 2024-12-24 DIAGNOSIS — M25.531 WRIST PAIN, RIGHT: Primary | ICD-10-CM

## 2024-12-24 DIAGNOSIS — M65.4 RADIAL STYLOID TENOSYNOVITIS: ICD-10-CM

## 2024-12-24 PROCEDURE — 2500000004 HC RX 250 GENERAL PHARMACY W/ HCPCS (ALT 636 FOR OP/ED): Performed by: ORTHOPAEDIC SURGERY

## 2024-12-24 PROCEDURE — 20550 NJX 1 TENDON SHEATH/LIGAMENT: CPT | Mod: RT | Performed by: ORTHOPAEDIC SURGERY

## 2024-12-24 PROCEDURE — 73110 X-RAY EXAM OF WRIST: CPT | Mod: RT

## 2024-12-24 PROCEDURE — 99214 OFFICE O/P EST MOD 30 MIN: CPT | Mod: 25 | Performed by: ORTHOPAEDIC SURGERY

## 2024-12-24 RX ORDER — TRIAMCINOLONE ACETONIDE 40 MG/ML
20 INJECTION, SUSPENSION INTRA-ARTICULAR; INTRAMUSCULAR
Status: COMPLETED | OUTPATIENT
Start: 2024-12-24 | End: 2024-12-24

## 2024-12-24 RX ORDER — LIDOCAINE HYDROCHLORIDE 10 MG/ML
0.5 INJECTION, SOLUTION INFILTRATION; PERINEURAL
Status: COMPLETED | OUTPATIENT
Start: 2024-12-24 | End: 2024-12-24

## 2024-12-24 ASSESSMENT — PAIN - FUNCTIONAL ASSESSMENT: PAIN_FUNCTIONAL_ASSESSMENT: 0-10

## 2024-12-24 ASSESSMENT — PAIN DESCRIPTION - DESCRIPTORS: DESCRIPTORS: ACHING;SORE

## 2024-12-24 ASSESSMENT — PAIN SCALES - GENERAL: PAINLEVEL_OUTOF10: 5 - MODERATE PAIN

## 2024-12-24 NOTE — LETTER
December 24, 2024     CARON Aleman-CNP  [address]  [address]  [facility] [address]    Patient: Astrid Travis   YOB: 1955   Date of Visit: 12/24/2024       Dear CARON Lux-CNP:    Thank you for referring Astrid Travis to me for evaluation. Below are my notes for this consultation.  If you have questions, please do not hesitate to call me. I look forward to following your patient along with you.       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 Symptoms began after her second shingles vaccination shot in late August. She saw her primary care physician who diagnosed her with the De Quervain tenosynovitis. She has intermittently worn in a cock-up wrist race,  which wasn't helping that much and caused some skin breakout on her palm. She has tried topical analgesics,  OTC anti-inflammatory medication,  and a short course of oral steroids without any significant improvement of the symptoms. Her symptoms fluctuate and seem to be aggravated by repetitive and forceful thumb and wrist maneuvers.        Please refer to New Patient Intake Form scanned into patient's electronic record for self reported past medical history, past surgical history, medications, allergies, family history, social history and 10 point review of systems    Examination:  Constitutional: Oriented to person, place, and time.  Appears well-developed and well-nourished.  Head: Normocephalic and atraumatic.  Eyes: Pupils are equal, round, and reactive to  light.  Cardiovascular: Intact distal pulses.  Pulmonary/Chest/Breast: Effort normal. No respiratory distress.  Neurological: Alert and oriented to person, place, and time.  Skin: Skin is warm and dry.  Psychiatric: normal mood and affect.  Behavior is normal.  Musculoskeletal: Examination today reveals normal appearance of the right wrist. She has some mild swelling over the radial styloid, focal tenderness over the radial styloid with a markedly positive Finkelstein maneuver, minimal discomfort over the thumb CMC joint.         Personal Interpretation of Diagnostic studies: X rays of the right wrist demonstrates some bony changes around the first dorsal compartment and radial styloid, but otherwise normal findings.      Impression:  De Quervain tenosynovitis       Plan: We have discussed the diagnosis and treatment options we're gonna provide her with a more appropriate thumb spike splint to wear at night time while sleeping and as needed during the day, she has agreed to proceed with steroid injection into the right wrist today as well. She had a right wrist first dorsal compartment injection and then she will follow up with me as needed if she has recurrence ever symptoms in the future      In Office Procedures Performed: She had a right wrist first dorsal compartment injection   Hand / UE Inj/Asp: R extensor compartment 1 for de Quervain's tenosynovitis on 12/24/2024 12:04 PM  Indications: tendon swelling and pain  Details: 25 G needle, radial approach  Medications: 20 mg triamcinolone acetonide 40 mg/mL; 0.5 mL lidocaine 10 mg/mL (1 %)  Outcome: tolerated well, no immediate complications  Procedure, treatment alternatives, risks and benefits explained, specific risks discussed. Immediately prior to procedure a time out was called to verify the correct patient, procedure, equipment, support staff and site/side marked as required. Patient was prepped and draped in the usual sterile fashion.         Patient was  prescribed a thumb spica splint for deQuervain tenosynovitis. The patient has weakness, instability and/or deformity of their right wrist which requires stabilization from this orthosis to improve their function.      Verbal and written instructions for the use, wear schedule, cleaning and application of this item were given.  Patient was instructed that should the brace result in increased pain, decreased sensation, increased swelling, or an overall worsening of their medical condition, to please contact our office immediately.     Orthotic management and training was provided for skin care, modifications due to healing tissues, edema changes, interruption in skin integrity, and safety precautions with the orthosis.        Follow up: PRN basis if she has recurrence ever symptoms in the future       Jaylan Forbes MD  [facility]  Department of Orthopaedic Surgery  Hand and Upper Extremity Reconstruction      Scribe Attestation  By signing my name below, Linda FINE Scribe   attest that this documentation has been prepared under the direction and in the presence of Dr. Jaylan Forbes.    Scribe Attestation  By signing my name below, Huyen FINE Scribe   attmatias that this documentation has been prepared under the direction and in the presence of Jaylan Forbes MD.      Dictation performed with the use of voice recognition software.  Syntax and grammatical errors may exist.

## 2024-12-29 ENCOUNTER — OFFICE VISIT (OUTPATIENT)
Dept: URGENT CARE | Age: 69
End: 2024-12-29
Payer: COMMERCIAL

## 2024-12-29 VITALS
DIASTOLIC BLOOD PRESSURE: 68 MMHG | OXYGEN SATURATION: 96 % | SYSTOLIC BLOOD PRESSURE: 109 MMHG | RESPIRATION RATE: 18 BRPM | TEMPERATURE: 98.3 F

## 2024-12-29 DIAGNOSIS — R05.9 COUGH, UNSPECIFIED TYPE: ICD-10-CM

## 2024-12-29 LAB — POC SARS-COV-2 AG BINAX: ABNORMAL

## 2024-12-29 PROCEDURE — 99213 OFFICE O/P EST LOW 20 MIN: CPT | Performed by: SPECIALIST

## 2024-12-29 PROCEDURE — 1036F TOBACCO NON-USER: CPT | Performed by: SPECIALIST

## 2024-12-29 PROCEDURE — 87811 SARS-COV-2 COVID19 W/OPTIC: CPT | Performed by: SPECIALIST

## 2024-12-29 PROCEDURE — 3074F SYST BP LT 130 MM HG: CPT | Performed by: SPECIALIST

## 2024-12-29 PROCEDURE — 1157F ADVNC CARE PLAN IN RCRD: CPT | Performed by: SPECIALIST

## 2024-12-29 PROCEDURE — 3078F DIAST BP <80 MM HG: CPT | Performed by: SPECIALIST

## 2024-12-29 PROCEDURE — 1159F MED LIST DOCD IN RCRD: CPT | Performed by: SPECIALIST

## 2024-12-29 ASSESSMENT — ENCOUNTER SYMPTOMS
FATIGUE: 1
FEVER: 1
SHORTNESS OF BREATH: 1
COUGH: 1

## 2024-12-29 ASSESSMENT — PATIENT HEALTH QUESTIONNAIRE - PHQ9
2. FEELING DOWN, DEPRESSED OR HOPELESS: NOT AT ALL
SUM OF ALL RESPONSES TO PHQ9 QUESTIONS 1 AND 2: 0
1. LITTLE INTEREST OR PLEASURE IN DOING THINGS: NOT AT ALL

## 2024-12-29 NOTE — PROGRESS NOTES
Subjective   Patient ID: Astrid Travis is a 69 y.o. female. They present today with a chief complaint of flu like symptoms, Cough (Dry cough since thurday rubatasin tylenol ibuprofen), and Nasal Congestion.    History of Present Illness  HPI    Past Medical History  Allergies as of 12/29/2024 - Reviewed 12/29/2024   Allergen Reaction Noted    Nyquil Hives 12/21/2022    Shellfish containing products Hives 05/04/2023       (Not in a hospital admission)       Past Medical History:   Diagnosis Date    Acute bronchitis 05/04/2023    Baker's cyst, ruptured 05/04/2023    Chondromalacia of left patella 05/04/2023    Dietary counseling and surveillance 01/04/2019    Pre-bariatric surgery nutrition evaluation    Left knee pain 05/04/2023    Other acute postprocedural pain     Acute post-operative pain    Other conditions influencing health status 01/29/2019    Prevention of blood clots    Other pericardial effusion (noninflammatory) ([facility]) 08/20/2018    Pericardial effusion    Personal history of other diseases of the circulatory system     History of hypertension    Personal history of other diseases of the respiratory system     History of bronchitis    Personal history of other specified conditions 03/13/2019    History of chronic cough    Personal history of other specified conditions     History of postoperative nausea and vomiting    Right knee pain 05/04/2023    Unspecified atrial fibrillation (Multi)     Atrial fibrillation    Unspecified diastolic (congestive) heart failure     Diastolic congestive heart failure    Uveitis        Past Surgical History:   Procedure Laterality Date    CT ANGIO CORONARY ART WITH HEARTFLOW IF SCORE >30%  5/5/2022    CT HEART CORONARY ANGIOGRAM 5/5/2022 [facility]CILL[facility]    HYSTEROSCOPY  07/18/2018    Hysteroscopy With Endometrial Ablation    OOPHORECTOMY  08/20/2018    Oophorectomy    OTHER SURGICAL HISTORY  05/30/2013    Atrial Cardioversion    OTHER SURGICAL HISTORY   04/16/2019    Radiofrequency ablation    OTHER SURGICAL HISTORY  01/04/2019    Josefina-en-Y gastric bypass    OTHER SURGICAL HISTORY  07/18/2018    Endometrial Biopsy By Hysteroscopy    OTHER SURGICAL HISTORY  08/20/2018    Creation Of Pericardial Window        reports that she has never smoked. She has never used smokeless tobacco. She reports current alcohol use. She reports that she does not use drugs.    Review of Systems  Review of Systems   Constitutional:  Positive for fatigue and fever.   HENT:  Positive for congestion.    Respiratory:  Positive for cough and shortness of breath.                                   Objective    Vitals:    12/29/24 1032   BP: 109/68   BP Location: Left arm   Patient Position: Sitting   BP Cuff Size: Adult   Resp: 18   Temp: 36.8 °C (98.3 °F)   TempSrc: Oral   SpO2: 96%     No LMP recorded. Patient is postmenopausal.    Physical Exam  Constitutional:       Appearance: Normal appearance.   HENT:      Nose: Congestion present.      Mouth/Throat:      Pharynx: Posterior oropharyngeal erythema present.   Cardiovascular:      Rate and Rhythm: Normal rate and regular rhythm.   Pulmonary:      Effort: Pulmonary effort is normal.      Breath sounds: Normal breath sounds.   Neurological:      Mental Status: She is alert.         Procedures    Point of Care Test & Imaging Results from this visit  Results for orders placed or performed in visit on 12/29/24   POCT Covid-19 Rapid Antigen   Result Value Ref Range    POC ELDA-COV-2 AG Positive test for SARS-CoV-2 (antigen detected) (A) Presumptive negative test for SARS-CoV-2 (no antigen detected)      No results found.    Diagnostic study results (if any) were reviewed by Ofe Phelps MD.    Assessment/Plan   Allergies, medications, history, and pertinent labs/EKGs/Imaging reviewed by Ofe Phelps MD.     Medical Decision Making      Orders and Diagnoses  Diagnoses and all orders for this visit:  Cough, unspecified type  -     POCT  Covid-19 Rapid Antigen      Medical Admin Record      Patient disposition: Home    Electronically signed by Ofe Phelps MD  10:59 AM       appropriate for situation

## 2024-12-30 ENCOUNTER — PHARMACY VISIT (OUTPATIENT)
Dept: PHARMACY | Facility: CLINIC | Age: 69
End: 2024-12-30
Payer: COMMERCIAL

## 2024-12-30 DIAGNOSIS — U07.1 COVID-19: Primary | ICD-10-CM

## 2024-12-30 PROCEDURE — RXMED WILLOW AMBULATORY MEDICATION CHARGE

## 2025-01-12 ENCOUNTER — HOSPITAL ENCOUNTER (OUTPATIENT)
Facility: HOSPITAL | Age: 70
Setting detail: OBSERVATION
End: 2025-01-12
Attending: STUDENT IN AN ORGANIZED HEALTH CARE EDUCATION/TRAINING PROGRAM | Admitting: STUDENT IN AN ORGANIZED HEALTH CARE EDUCATION/TRAINING PROGRAM
Payer: MEDICARE

## 2025-01-12 ENCOUNTER — APPOINTMENT (OUTPATIENT)
Dept: CARDIOLOGY | Facility: HOSPITAL | Age: 70
End: 2025-01-12
Payer: MEDICARE

## 2025-01-12 ENCOUNTER — APPOINTMENT (OUTPATIENT)
Dept: RADIOLOGY | Facility: HOSPITAL | Age: 70
End: 2025-01-12
Payer: MEDICARE

## 2025-01-12 VITALS
HEIGHT: 64 IN | HEART RATE: 62 BPM | DIASTOLIC BLOOD PRESSURE: 69 MMHG | RESPIRATION RATE: 16 BRPM | BODY MASS INDEX: 37.56 KG/M2 | TEMPERATURE: 98.5 F | WEIGHT: 220 LBS | OXYGEN SATURATION: 95 % | SYSTOLIC BLOOD PRESSURE: 112 MMHG

## 2025-01-12 DIAGNOSIS — J90 PLEURAL EFFUSION, LEFT: ICD-10-CM

## 2025-01-12 DIAGNOSIS — R07.89 OTHER CHEST PAIN: ICD-10-CM

## 2025-01-12 DIAGNOSIS — R06.02 SHORTNESS OF BREATH: ICD-10-CM

## 2025-01-12 DIAGNOSIS — R07.9 CHEST PAIN, UNSPECIFIED TYPE: Primary | ICD-10-CM

## 2025-01-12 DIAGNOSIS — I20.0 UNSTABLE ANGINA (MULTI): ICD-10-CM

## 2025-01-12 DIAGNOSIS — I20.89 STABLE ANGINA (CMS-HCC): ICD-10-CM

## 2025-01-12 PROBLEM — Z86.718 HISTORY OF THROMBOEMBOLISM OF VEIN: Status: ACTIVE | Noted: 2025-01-12

## 2025-01-12 PROBLEM — I50.30 DIASTOLIC HEART FAILURE: Status: ACTIVE | Noted: 2025-01-12

## 2025-01-12 PROBLEM — S72.90XA FRACTURE OF FEMUR: Status: ACTIVE | Noted: 2025-01-12

## 2025-01-12 PROBLEM — Z86.69 HISTORY OF UVEITIS: Status: ACTIVE | Noted: 2025-01-12

## 2025-01-12 LAB
ALBUMIN SERPL BCP-MCNC: 4 G/DL (ref 3.4–5)
ALP SERPL-CCNC: 68 U/L (ref 33–136)
ALT SERPL W P-5'-P-CCNC: 15 U/L (ref 7–45)
ANION GAP SERPL CALC-SCNC: 11 MMOL/L (ref 10–20)
AST SERPL W P-5'-P-CCNC: 16 U/L (ref 9–39)
BASOPHILS # BLD AUTO: 0.06 X10*3/UL (ref 0–0.1)
BASOPHILS NFR BLD AUTO: 0.7 %
BILIRUB SERPL-MCNC: 0.6 MG/DL (ref 0–1.2)
BNP SERPL-MCNC: 78 PG/ML (ref 0–99)
BUN SERPL-MCNC: 11 MG/DL (ref 6–23)
CALCIUM SERPL-MCNC: 9.4 MG/DL (ref 8.6–10.3)
CARDIAC TROPONIN I PNL SERPL HS: 3 NG/L (ref 0–13)
CARDIAC TROPONIN I PNL SERPL HS: <3 NG/L (ref 0–13)
CHLORIDE SERPL-SCNC: 105 MMOL/L (ref 98–107)
CHOLEST SERPL-MCNC: 174 MG/DL (ref 0–199)
CHOLESTEROL/HDL RATIO: 2.2
CO2 SERPL-SCNC: 28 MMOL/L (ref 21–32)
CREAT SERPL-MCNC: 0.8 MG/DL (ref 0.5–1.05)
EGFRCR SERPLBLD CKD-EPI 2021: 81 ML/MIN/1.73M*2
EOSINOPHIL # BLD AUTO: 0.23 X10*3/UL (ref 0–0.7)
EOSINOPHIL NFR BLD AUTO: 2.8 %
ERYTHROCYTE [DISTWIDTH] IN BLOOD BY AUTOMATED COUNT: 12 % (ref 11.5–14.5)
GLUCOSE SERPL-MCNC: 95 MG/DL (ref 74–99)
HCT VFR BLD AUTO: 39.6 % (ref 36–46)
HDLC SERPL-MCNC: 78.4 MG/DL
HGB BLD-MCNC: 13.1 G/DL (ref 12–16)
IMM GRANULOCYTES # BLD AUTO: 0.03 X10*3/UL (ref 0–0.7)
IMM GRANULOCYTES NFR BLD AUTO: 0.4 % (ref 0–0.9)
INR PPP: 1 (ref 0.9–1.1)
LDLC SERPL CALC-MCNC: 74 MG/DL
LYMPHOCYTES # BLD AUTO: 1.65 X10*3/UL (ref 1.2–4.8)
LYMPHOCYTES NFR BLD AUTO: 20.4 %
MAGNESIUM SERPL-MCNC: 1.84 MG/DL (ref 1.6–2.4)
MCH RBC QN AUTO: 31.1 PG (ref 26–34)
MCHC RBC AUTO-ENTMCNC: 33.1 G/DL (ref 32–36)
MCV RBC AUTO: 94 FL (ref 80–100)
MONOCYTES # BLD AUTO: 0.77 X10*3/UL (ref 0.1–1)
MONOCYTES NFR BLD AUTO: 9.5 %
NEUTROPHILS # BLD AUTO: 5.35 X10*3/UL (ref 1.2–7.7)
NEUTROPHILS NFR BLD AUTO: 66.2 %
NON HDL CHOLESTEROL: 96 MG/DL (ref 0–149)
NRBC BLD-RTO: 0 /100 WBCS (ref 0–0)
PLATELET # BLD AUTO: 414 X10*3/UL (ref 150–450)
POTASSIUM SERPL-SCNC: 3.8 MMOL/L (ref 3.5–5.3)
PROT SERPL-MCNC: 6.3 G/DL (ref 6.4–8.2)
PROTHROMBIN TIME: 11.5 SECONDS (ref 9.8–12.8)
RBC # BLD AUTO: 4.21 X10*6/UL (ref 4–5.2)
SODIUM SERPL-SCNC: 140 MMOL/L (ref 136–145)
TRIGL SERPL-MCNC: 107 MG/DL (ref 0–149)
TSH SERPL-ACNC: 2.75 MIU/L (ref 0.44–3.98)
VLDL: 21 MG/DL (ref 0–40)
WBC # BLD AUTO: 8.1 X10*3/UL (ref 4.4–11.3)

## 2025-01-12 PROCEDURE — 85025 COMPLETE CBC W/AUTO DIFF WBC: CPT | Performed by: PHYSICIAN ASSISTANT

## 2025-01-12 PROCEDURE — 83036 HEMOGLOBIN GLYCOSYLATED A1C: CPT | Mod: AHULAB

## 2025-01-12 PROCEDURE — 71275 CT ANGIOGRAPHY CHEST: CPT

## 2025-01-12 PROCEDURE — 93005 ELECTROCARDIOGRAM TRACING: CPT | Mod: 59

## 2025-01-12 PROCEDURE — G0378 HOSPITAL OBSERVATION PER HR: HCPCS

## 2025-01-12 PROCEDURE — 2500000001 HC RX 250 WO HCPCS SELF ADMINISTERED DRUGS (ALT 637 FOR MEDICARE OP): Performed by: NURSE PRACTITIONER

## 2025-01-12 PROCEDURE — 2500000004 HC RX 250 GENERAL PHARMACY W/ HCPCS (ALT 636 FOR OP/ED): Performed by: NURSE PRACTITIONER

## 2025-01-12 PROCEDURE — 2500000004 HC RX 250 GENERAL PHARMACY W/ HCPCS (ALT 636 FOR OP/ED): Performed by: PHYSICIAN ASSISTANT

## 2025-01-12 PROCEDURE — 85610 PROTHROMBIN TIME: CPT | Performed by: PHYSICIAN ASSISTANT

## 2025-01-12 PROCEDURE — 36415 COLL VENOUS BLD VENIPUNCTURE: CPT | Performed by: PHYSICIAN ASSISTANT

## 2025-01-12 PROCEDURE — 84484 ASSAY OF TROPONIN QUANT: CPT | Performed by: PHYSICIAN ASSISTANT

## 2025-01-12 PROCEDURE — 96375 TX/PRO/DX INJ NEW DRUG ADDON: CPT | Mod: 59

## 2025-01-12 PROCEDURE — 2500000001 HC RX 250 WO HCPCS SELF ADMINISTERED DRUGS (ALT 637 FOR MEDICARE OP): Performed by: INTERNAL MEDICINE

## 2025-01-12 PROCEDURE — 2500000002 HC RX 250 W HCPCS SELF ADMINISTERED DRUGS (ALT 637 FOR MEDICARE OP, ALT 636 FOR OP/ED): Performed by: INTERNAL MEDICINE

## 2025-01-12 PROCEDURE — 71045 X-RAY EXAM CHEST 1 VIEW: CPT | Mod: FOREIGN READ | Performed by: RADIOLOGY

## 2025-01-12 PROCEDURE — 80061 LIPID PANEL: CPT | Performed by: NURSE PRACTITIONER

## 2025-01-12 PROCEDURE — 84443 ASSAY THYROID STIM HORMONE: CPT | Performed by: NURSE PRACTITIONER

## 2025-01-12 PROCEDURE — 93005 ELECTROCARDIOGRAM TRACING: CPT

## 2025-01-12 PROCEDURE — 2550000001 HC RX 255 CONTRASTS: Performed by: STUDENT IN AN ORGANIZED HEALTH CARE EDUCATION/TRAINING PROGRAM

## 2025-01-12 PROCEDURE — 99285 EMERGENCY DEPT VISIT HI MDM: CPT | Mod: 25 | Performed by: EMERGENCY MEDICINE

## 2025-01-12 PROCEDURE — 71275 CT ANGIOGRAPHY CHEST: CPT | Mod: FOREIGN READ | Performed by: RADIOLOGY

## 2025-01-12 PROCEDURE — 83880 ASSAY OF NATRIURETIC PEPTIDE: CPT | Performed by: PHYSICIAN ASSISTANT

## 2025-01-12 PROCEDURE — 99222 1ST HOSP IP/OBS MODERATE 55: CPT

## 2025-01-12 PROCEDURE — 96365 THER/PROPH/DIAG IV INF INIT: CPT | Mod: 59

## 2025-01-12 PROCEDURE — 80053 COMPREHEN METABOLIC PANEL: CPT | Performed by: PHYSICIAN ASSISTANT

## 2025-01-12 PROCEDURE — 71045 X-RAY EXAM CHEST 1 VIEW: CPT

## 2025-01-12 PROCEDURE — 83735 ASSAY OF MAGNESIUM: CPT | Performed by: PHYSICIAN ASSISTANT

## 2025-01-12 RX ORDER — ALBUTEROL SULFATE 0.83 MG/ML
2.5 SOLUTION RESPIRATORY (INHALATION) EVERY 2 HOUR PRN
Status: ACTIVE | OUTPATIENT
Start: 2025-01-12

## 2025-01-12 RX ORDER — PANTOPRAZOLE SODIUM 40 MG/1
40 TABLET, DELAYED RELEASE ORAL
Status: DISPENSED | OUTPATIENT
Start: 2025-01-12

## 2025-01-12 RX ORDER — ACETAMINOPHEN 325 MG/1
650 TABLET ORAL EVERY 4 HOURS PRN
Status: DISPENSED | OUTPATIENT
Start: 2025-01-12

## 2025-01-12 RX ORDER — PANTOPRAZOLE SODIUM 40 MG/10ML
40 INJECTION, POWDER, LYOPHILIZED, FOR SOLUTION INTRAVENOUS
Status: ACTIVE | OUTPATIENT
Start: 2025-01-12

## 2025-01-12 RX ORDER — NAPROXEN SODIUM 220 MG/1
324 TABLET, FILM COATED ORAL ONCE
Status: COMPLETED | OUTPATIENT
Start: 2025-01-12 | End: 2025-01-12

## 2025-01-12 RX ORDER — FAMOTIDINE 10 MG/ML
20 INJECTION INTRAVENOUS ONCE
Status: COMPLETED | OUTPATIENT
Start: 2025-01-12 | End: 2025-01-12

## 2025-01-12 RX ORDER — ONDANSETRON 4 MG/1
4 TABLET, FILM COATED ORAL EVERY 8 HOURS PRN
Status: ACTIVE | OUTPATIENT
Start: 2025-01-12

## 2025-01-12 RX ORDER — LOSARTAN POTASSIUM 50 MG/1
100 TABLET ORAL DAILY
Status: ACTIVE | OUTPATIENT
Start: 2025-01-13

## 2025-01-12 RX ORDER — MORPHINE SULFATE 4 MG/ML
4 INJECTION, SOLUTION INTRAMUSCULAR; INTRAVENOUS ONCE
Status: COMPLETED | OUTPATIENT
Start: 2025-01-12 | End: 2025-01-12

## 2025-01-12 RX ORDER — FERROUS SULFATE 325(65) MG
65 TABLET ORAL EVERY MORNING
Status: DISPENSED | OUTPATIENT
Start: 2025-01-12

## 2025-01-12 RX ORDER — MAGNESIUM SULFATE 1 G/100ML
1 INJECTION INTRAVENOUS ONCE
Status: COMPLETED | OUTPATIENT
Start: 2025-01-12 | End: 2025-01-12

## 2025-01-12 RX ORDER — ALBUTEROL SULFATE 90 UG/1
2 INHALANT RESPIRATORY (INHALATION) EVERY 6 HOURS PRN
Status: DISCONTINUED | OUTPATIENT
Start: 2025-01-12 | End: 2025-01-12 | Stop reason: CLARIF

## 2025-01-12 RX ORDER — ACETAMINOPHEN 650 MG/1
650 SUPPOSITORY RECTAL EVERY 4 HOURS PRN
Status: ACTIVE | OUTPATIENT
Start: 2025-01-12

## 2025-01-12 RX ORDER — ACETAMINOPHEN 160 MG/5ML
650 SOLUTION ORAL EVERY 4 HOURS PRN
Status: ACTIVE | OUTPATIENT
Start: 2025-01-12

## 2025-01-12 RX ORDER — ONDANSETRON HYDROCHLORIDE 2 MG/ML
4 INJECTION, SOLUTION INTRAVENOUS ONCE
Status: COMPLETED | OUTPATIENT
Start: 2025-01-12 | End: 2025-01-12

## 2025-01-12 RX ORDER — POTASSIUM CHLORIDE 20 MEQ/1
20 TABLET, EXTENDED RELEASE ORAL ONCE
Status: COMPLETED | OUTPATIENT
Start: 2025-01-12 | End: 2025-01-12

## 2025-01-12 RX ORDER — NAPROXEN SODIUM 220 MG/1
81 TABLET, FILM COATED ORAL DAILY
Status: ACTIVE | OUTPATIENT
Start: 2025-01-13

## 2025-01-12 RX ORDER — POTASSIUM CHLORIDE 1.5 G/1.58G
20 POWDER, FOR SOLUTION ORAL ONCE
Status: COMPLETED | OUTPATIENT
Start: 2025-01-12 | End: 2025-01-12

## 2025-01-12 RX ORDER — HYDROMORPHONE HYDROCHLORIDE 1 MG/ML
0.6 INJECTION, SOLUTION INTRAMUSCULAR; INTRAVENOUS; SUBCUTANEOUS ONCE
Status: COMPLETED | OUTPATIENT
Start: 2025-01-12 | End: 2025-01-12

## 2025-01-12 RX ORDER — CHOLECALCIFEROL (VITAMIN D3) 25 MCG
2000 TABLET ORAL DAILY
Status: ACTIVE | OUTPATIENT
Start: 2025-01-12

## 2025-01-12 RX ORDER — ALBUTEROL SULFATE 0.83 MG/ML
2.5 SOLUTION RESPIRATORY (INHALATION)
Status: DISCONTINUED | OUTPATIENT
Start: 2025-01-12 | End: 2025-01-12

## 2025-01-12 RX ORDER — ONDANSETRON HYDROCHLORIDE 2 MG/ML
4 INJECTION, SOLUTION INTRAVENOUS EVERY 8 HOURS PRN
Status: ACTIVE | OUTPATIENT
Start: 2025-01-12

## 2025-01-12 RX ADMIN — FERROUS SULFATE TAB 325 MG (65 MG ELEMENTAL FE) 325 MG: 325 (65 FE) TAB at 10:18

## 2025-01-12 RX ADMIN — POTASSIUM CHLORIDE 20 MEQ: 1.5 POWDER, FOR SOLUTION ORAL at 18:48

## 2025-01-12 RX ADMIN — ACETAMINOPHEN 650 MG: 325 TABLET, FILM COATED ORAL at 20:07

## 2025-01-12 RX ADMIN — MORPHINE SULFATE 4 MG: 4 INJECTION, SOLUTION INTRAMUSCULAR; INTRAVENOUS at 03:34

## 2025-01-12 RX ADMIN — ACETAMINOPHEN 650 MG: 325 TABLET, FILM COATED ORAL at 10:17

## 2025-01-12 RX ADMIN — RIVAROXABAN 20 MG: 20 TABLET, FILM COATED ORAL at 10:18

## 2025-01-12 RX ADMIN — IOHEXOL 75 ML: 350 INJECTION, SOLUTION INTRAVENOUS at 09:44

## 2025-01-12 RX ADMIN — FAMOTIDINE 20 MG: 10 INJECTION, SOLUTION INTRAVENOUS at 06:03

## 2025-01-12 RX ADMIN — MAGNESIUM SULFATE HEPTAHYDRATE 1 G: 1 INJECTION, SOLUTION INTRAVENOUS at 18:44

## 2025-01-12 RX ADMIN — ASPIRIN 324 MG: 81 TABLET, CHEWABLE ORAL at 21:53

## 2025-01-12 RX ADMIN — PANTOPRAZOLE SODIUM 40 MG: 40 TABLET, DELAYED RELEASE ORAL at 10:19

## 2025-01-12 RX ADMIN — ONDANSETRON 4 MG: 2 INJECTION, SOLUTION INTRAMUSCULAR; INTRAVENOUS at 03:33

## 2025-01-12 RX ADMIN — HYDROMORPHONE HYDROCHLORIDE 0.6 MG: 1 INJECTION, SOLUTION INTRAMUSCULAR; INTRAVENOUS; SUBCUTANEOUS at 06:03

## 2025-01-12 SDOH — SOCIAL STABILITY: SOCIAL INSECURITY: WERE YOU ABLE TO COMPLETE ALL THE BEHAVIORAL HEALTH SCREENINGS?: YES

## 2025-01-12 SDOH — SOCIAL STABILITY: SOCIAL INSECURITY: HAVE YOU HAD THOUGHTS OF HARMING ANYONE ELSE?: NO

## 2025-01-12 ASSESSMENT — HEART SCORE
ECG: NORMAL
HEART SCORE: 4
RISK FACTORS: >2 RISK FACTORS OR HX OF ATHEROSCLEROTIC DISEASE
AGE: 65+
HISTORY: SLIGHTLY SUSPICIOUS
TROPONIN: LESS THAN OR EQUAL TO NORMAL LIMIT

## 2025-01-12 ASSESSMENT — PAIN - FUNCTIONAL ASSESSMENT
PAIN_FUNCTIONAL_ASSESSMENT: 0-10
PAIN_FUNCTIONAL_ASSESSMENT: 0-10

## 2025-01-12 ASSESSMENT — PAIN SCALES - GENERAL
PAINLEVEL_OUTOF10: 10 - WORST POSSIBLE PAIN
PAINLEVEL_OUTOF10: 3
PAINLEVEL_OUTOF10: 6
PAINLEVEL_OUTOF10: 10 - WORST POSSIBLE PAIN
PAINLEVEL_OUTOF10: 0 - NO PAIN
PAINLEVEL_OUTOF10: 5 - MODERATE PAIN
PAINLEVEL_OUTOF10: 6

## 2025-01-12 ASSESSMENT — PAIN DESCRIPTION - LOCATION
LOCATION: CHEST
LOCATION: HEAD
LOCATION: CHEST
LOCATION: HEAD

## 2025-01-12 ASSESSMENT — PATIENT HEALTH QUESTIONNAIRE - PHQ9
2. FEELING DOWN, DEPRESSED OR HOPELESS: NOT AT ALL
1. LITTLE INTEREST OR PLEASURE IN DOING THINGS: NOT AT ALL
SUM OF ALL RESPONSES TO PHQ9 QUESTIONS 1 & 2: 0

## 2025-01-12 ASSESSMENT — LIFESTYLE VARIABLES
AUDIT-C TOTAL SCORE: 1
HOW OFTEN DO YOU HAVE 6 OR MORE DRINKS ON ONE OCCASION: NEVER
SKIP TO QUESTIONS 9-10: 1
AUDIT-C TOTAL SCORE: 1
HOW MANY STANDARD DRINKS CONTAINING ALCOHOL DO YOU HAVE ON A TYPICAL DAY: 1 OR 2
HOW OFTEN DO YOU HAVE A DRINK CONTAINING ALCOHOL: MONTHLY OR LESS

## 2025-01-12 ASSESSMENT — PAIN DESCRIPTION - PAIN TYPE: TYPE: ACUTE PAIN

## 2025-01-12 ASSESSMENT — PAIN DESCRIPTION - DESCRIPTORS
DESCRIPTORS: ACHING;DULL;CRAMPING
DESCRIPTORS: PRESSURE

## 2025-01-12 ASSESSMENT — PAIN DESCRIPTION - FREQUENCY: FREQUENCY: CONSTANT/CONTINUOUS

## 2025-01-12 ASSESSMENT — COLUMBIA-SUICIDE SEVERITY RATING SCALE - C-SSRS
1. IN THE PAST MONTH, HAVE YOU WISHED YOU WERE DEAD OR WISHED YOU COULD GO TO SLEEP AND NOT WAKE UP?: NO
2. HAVE YOU ACTUALLY HAD ANY THOUGHTS OF KILLING YOURSELF?: NO
6. HAVE YOU EVER DONE ANYTHING, STARTED TO DO ANYTHING, OR PREPARED TO DO ANYTHING TO END YOUR LIFE?: NO

## 2025-01-12 NOTE — CONSULTS
Inpatient consult to Cardiology  Consult performed by: Sanjana Daugherty, APRN-CNP  Consult ordered by: Nina Burns PA-C  Reason for consult: chest pain      HPI:  Astrid Travis is a 69 y.o. female, with a PMH of HTN, pAF on Xarelto, hypercoagulable state d/t MTHFR mutation w/ hx of DVT/PE, pericardial effusion s/p pericardial window 2001, depression, and OA, who presented to [facility] on 1/12/2025 for chest pain and ANTHONY. Patient reports that she was shopping at [employer] today with her  when she developed chest pressure with associated shortness of breath that was worse with mild exertion and deep breaths. She describes the pain as a heavy pressure, like someone sitting on her chest. The pain is not reproducible on exam. Initially the pain resolved with rest but returned a short time later and did not go away.    She did recently have COVID at end of December but states the pain is different to that of what she felt with coughing. She no longer has a cough. Patient denies fever, chills, palpations, leg edema, SOB, cough, abd pain, urinary sx, bloody vomit or stools, headaches, lightheadedness or syncope, weakness, or trauma/travel/sick contacts.    Patient does not see a cardiologist regularly. Her last cardiac evaluation was in 2022 when she had an abnormal stress test as part of a long-COVID evaluation. Subsequent coronary CTA was negative and patient was instructed to start a statin and FU with cardiology but it does not appear she is taking this nor did she see a cardiologist.     ED course notable for stable VS, unremarkable labs including negative troponin, no EKG changes. CXR shows enlarged heart with borderline prominent central pulmonary vasculature.    Cardiology is consulted for chest pain.     Home CV Medications:  Xarelto 20mg daily in the AM  Losartan-HCTZ 100-25mg daily    Patient History   Past Medical History:  She has a past medical history of Acute bronchitis  (05/04/2023), Baker's cyst, ruptured (05/04/2023), Chondromalacia of left patella (05/04/2023), Dietary counseling and surveillance (01/04/2019), Left knee pain (05/04/2023), Other acute postprocedural pain, Other conditions influencing health status (01/29/2019), Other pericardial effusion (noninflammatory) ([facility]) (08/20/2018), Personal history of other diseases of the circulatory system, Personal history of other diseases of the respiratory system, Personal history of other specified conditions (03/13/2019), Personal history of other specified conditions, Right knee pain (05/04/2023), Unspecified atrial fibrillation (Multi), Unspecified diastolic (congestive) heart failure, and Uveitis.  Past Surgical History:  She has a past surgical history that includes Other surgical history (05/30/2013); Other surgical history (04/16/2019); Other surgical history (01/04/2019); Other surgical history (07/18/2018); Hysteroscopy (07/18/2018); Oophorectomy (08/20/2018); Other surgical history (08/20/2018); and CT angio coronary art with heartflow if score >30% (5/5/2022).  Social History:  She reports that she has never smoked. She has never used smokeless tobacco. She reports current alcohol use. She reports that she does not use drugs.  Family History: family history includes Alzheimer's disease in her father and mother; Atrial fibrillation in her father; Blood clot in her father; Breast cancer in her paternal grandmother; Breast cancer (age of onset: 65) in her mother's sister; Hypertension in her father and mother.     Allergies: Nyquil and Shellfish containing products    ROS: 10-point review of systems was performed and is otherwise negative except as noted in HPI.     Outpatient Medications:  Current Outpatient Medications   Medication Instructions    albuterol 90 mcg/actuation inhaler 2 puffs, inhalation, Every 6 hours PRN    cholecalciferol (VITAMIN D3) 50 mcg, Daily    ferrous sulfate 65 mg, Every morning     losartan-hydrochlorothiazide (Hyzaar) 100-25 mg tablet 1 tablet, oral, Daily    rivaroxaban (Xarelto) 20 mg tablet TAKE 1 TABLET BY MOUTH ONCE DAILY       Cardiovascular Testing:  EKG:   ECG 12 Lead 25      Echo:  Transthoracic Echocardiogram 3/2022  1. The left ventricular systolic function is normal with a 65% estimated ejection fraction.     Stress Test:  Nuclear Stress Test 2022  1. Abnormal stress myocardial perfusion imaging in response to pharmacologic stress concerning for mid to distal anterior wall  perfusion concerning for ischemia. Findings discussed with Francisca Bedoya CNP.  2. Well-maintained left ventricular function.    Cardiac Imaging:   CT Angio Heart 2022  1. Widely patent coronary arteries without evidence of obstructive stenosis.  2. The distal LAD was not completely visualized.  3. Previously noted findings the left lower lobe which could not be completely evaluated given the restricted field-of-view.      Diagnostic Results   Labs  CBC- 2025:  2:50 AM  8.1 13.1 414    39.6      BMP- 2025:  2:50 AM  140 11 105 95   3.8 0.80 28    Estimated Creatinine Clearance: 78.3 mL/min (by C-G formula based on SCr of 0.8 mg/dL).     CA: 9.4 PROTIEN: 6.3 ALT: 15 Total Bili: 0.6 M.84   PHOS: _ ALBUMIN: 4.0 AST: 16   Alk Phos: 68      COAGS- 2025:  2:50 AM  1.0   11.5 39     CV Labs  Troponin I, High Sensitivity   Date/Time Value Ref Range Status   2025 04:04 AM <3 0 - 13 ng/L Final   2025 02:50 AM 3 0 - 13 ng/L Final     BNP   Date/Time Value Ref Range Status   2025 02:50 AM 78 0 - 99 pg/mL Final   10/19/2022 09:20 AM 71 0 - 99 pg/mL Final   2022 07:56 AM 29 0 - 99 pg/mL Final     Hemoglobin A1C   Date/Time Value Ref Range Status   10/15/2024 10:27 AM 5.4 See comment % Final   10/19/2022 09:20 AM 5.3 % Final   2022 07:56 AM 5.7 (A) % Final     LDL Calculated   Date/Time Value Ref Range Status   2023 12:15 PM 99 <=99 mg/dL Final     Pertinent  "Imaging  XR chest 1 view 01/12/2025  Enlarged heart with borderline prominent central pulmonary vasculature. Blunting left costophrenic angle.    CT angio chest for pulmonary embolism 01/12/2025  1. No pulmonary embolism.  2. Small peripheral patchy areas of airspace consolidation in the left lower lobe with accompanying areas of atelectasis in the lower lobes. Correlate clinically for possible small areas of pneumonia.  3. Coronary artery calcifications.  4. Small hiatal hernia.         Last Recorded Vitals:  Vitals:    01/12/25 0900 01/12/25 0915 01/12/25 0930 01/12/25 1000   BP: 100/62 106/81 99/56    Pulse: 65 72 65 69   Resp: 13 20 14 18   Temp:       TempSrc:       SpO2: 97% 100% 96% 98%   Weight:       Height:         Temperature:  [36.9 °C (98.5 °F)] 36.9 °C (98.5 °F)  Heart Rate:  [65-74] 69  Respirations:  [12-21] 18  BP: ()/(50-84) 99/56     Last I/O:  No intake/output data recorded.    Physical Exam:   Vitals and nursing notes reviewed.  BP 99/56   Pulse 69   Temp 36.9 °C (98.5 °F) (Temporal)   Resp 18   Ht 1.626 m (5' 4\")   Wt 99.8 kg (220 lb)   SpO2 98% Comment: BENDS FINGER  BMI 37.76 kg/m²   GENERAL: Alert and awake, cooperative; in no acute distress  SKIN: Warm and dry, cap refill <2  HEENT: Normocephalic, PEERL, mucous membranes pink and moist  NECK: No JVD or hepatojugular reflex  CARDIAC: Regular rate and rhythm, S1S2, no murmurs or abnormal heart sounds  CHEST: Normal respiratory effort, no abnormal breath sounds  ABDOMEN: Soft, non-distended, non-tender with palpation  EXTREMITIES: No lower extremity edema, normal pulses all 4 extremities  NEURO: Alert and oriented, mental status at baseline, no focal deficits  PSYCH: Behavior and affect as expected     Tele: NSR w/ 1st degree AVB HR 70-80s  Code Status: Full Code    Assessment/Plan   Astrid Travis is a 69 y.o. female, with a PMH of HTN, pAF on Xarelto, hypercoagulable state d/t MTHFR mutation w/ hx of DVT/PE, pericardial " effusion s/p pericardial window 2001, depression, and OA, who presented to [facility] on 1/12/2025 for chest pain and ANTHONY. Pain is described as a heavy pressure, like someone sitting on her chest, that get worse with mild exertion and did not go away with rest. ED course notable for stable VS, unremarkable labs including negative troponin, no EKG changes. CXR shows enlarged heart with borderline prominent central pulmonary vasculature. Cardiology is consulted for chest pain. CP is now improved s/p Morphine and Dilaudid in the ED.    Home CV Medications: Xarelto 20mg daily in the AM, Losartan-HCTZ 100-25mg daily    #Stable Angina- Chest pain concerning for stable angina and patient w/ multiple risk factors [HTN, clotting disorder, obesity]  -CTA chest negative for PE  #HTN- BP currently soft, may need to adjust home medications  #HLD- Not currently on statin therapy  pAF- Currently NS, not on BB d/t hx of bradycardia, on Xarelto for AC  #Clotting Disorder- c/w Xarelto      RECOMMENDATIONS:  -Plan for [facility] tomorrow given description of stable angina and multiple CV risk factors  -NPO at 0000  -Hold Xarelto  -We will obtain a transthoracic echocardiogram for structural evaluation, measurement of ejection fraction, assessment of regional wall motion abnormalities or valvular disease, and further evaluation of hemodynamics   -Check HgbA1c and lipid panel  -Start statin if appropriate based on LDL  -Hold HCTZ given soft BP and in anticipation of dye load tomorrow  -Continue Losartan with parameters  -Continuous telemetry monitoring while admitted  -Monitor electrolytes, replete for K <4 and Mg <2      CARON Lares-CNP  Advanced Practice Provider  Cardiology  [facility]  01/12/25 2:48 PM     ==========================  Attending note  ==========================  Both the LINDA and I have had a face to face encounter with the patient today. I have examined the patient and edited the  documented physical examination as necessary.  I personally reviewed the patient's recent labs, medications, orders, EKGs, and pertinent cardiac imaging.  I have reviewed the LINDA's encounter note, approve the LINDA's documentation and have edited the note to reflect the diagnostic and therapeutic plan.    69-year-old female with a history of hypertension, paroxysmal atrial fibrillation on Xarelto, hypercoagulable state due to MTHFR mutation with a history of DVT/PE, pericardial effusion status post pericardial window in 2001, depression, and osteoarthritis presented to [facility] on 1/12/2025 for chest pain and dyspnea on exertion. She developed chest pressure and shortness of breath while shopping, described as a heavy sensation like someone sitting on her chest, worse with exertion and deep breaths but not reproducible on exam. Symptoms initially improved with rest but returned persistently. She recently had COVID in late December but describes this pain as different from her prior cough-related discomfort. Denies fever, chills, palpitations, leg edema, abdominal pain, urinary symptoms, hemoptysis, headaches, lightheadedness, syncope, weakness, trauma, travel, or sick contacts. She does not follow with a cardiologist and had an abnormal stress test in 2022 during a long-COVID evaluation, with a subsequent negative coronary CTA. Coronary CTA showed wildly patent arteries but the distal LAD is poorly opacified and luminal patency cannot be conclusively determined. At that point statin therapy was recommended, though it appears she did not start it or follow up. ED evaluation revealed stable vital signs, unremarkable labs including negative troponins, no EKG changes, and CXR showing an enlarged heart with borderline prominent central pulmonary vasculature. Cardiology consulted for chest pain evaluation.    Past medical history:  As above.    Medications were reviewed.    Allergies were reviewed.    Vital  "signs, telemetry, medications, labs, and imaging were reviewed as well.    Physical Exam:   Vitals and nursing notes reviewed.  BP 99/56   Pulse 69   Temp 36.9 °C (98.5 °F) (Temporal)   Resp 18   Ht 1.626 m (5' 4\")   Wt 99.8 kg (220 lb)   SpO2 98% Comment: BENDS FINGER  BMI 37.76 kg/m²   GENERAL: Alert and awake, cooperative; in no acute distress  SKIN: Warm and dry, cap refill <2  HEENT: Normocephalic, PEERL, mucous membranes pink and moist  NECK: No JVD or hepatojugular reflex  CARDIAC: Regular rate and rhythm, S1S2, no murmurs or abnormal heart sounds  CHEST: Normal respiratory effort, no abnormal breath sounds  ABDOMEN: Soft, non-distended, non-tender with palpation  EXTREMITIES: No lower extremity edema, normal pulses all 4 extremities  NEURO: Alert and oriented, mental status at baseline, no focal deficits  PSYCH: Behavior and affect as expected     Tele: NSR w/ 1st degree AVB HR 70-80s  Code Status: Full Code    Assessment/Plan   Astrid Travis is a 69 y.o. female, with a PMH of HTN, pAF on Xarelto, hypercoagulable state d/t MTHFR mutation w/ hx of DVT/PE, pericardial effusion s/p pericardial window 2001, depression, and OA, who presented to [facility] on 1/12/2025 for chest pain and ANTHONY. Pain is described as a heavy pressure, like someone sitting on her chest, that get worse with mild exertion and did not go away with rest. ED course notable for stable VS, unremarkable labs including negative troponin, no EKG changes. CXR shows enlarged heart with borderline prominent central pulmonary vasculature. Cardiology is consulted for chest pain. CP is now improved s/p Morphine and Dilaudid in the ED.    Home CV Medications: Xarelto 20mg daily in the AM, Losartan-HCTZ 100-25mg daily    #Stable Angina- Chest pain concerning for stable angina and patient w/ multiple risk factors [HTN, clotting disorder, obesity]  -CTA chest negative for PE  #HTN- BP currently soft, may need to adjust home " medications  #HLD- Not currently on statin therapy  pAF- Currently NS, not on BB d/t hx of bradycardia, on Xarelto for AC  #Clotting Disorder- c/w Xarelto    RECOMMENDATIONS:  -Plan for [facility] tomorrow given description of stable angina and multiple CV risk factors  -NPO at 0000  -Hold Xarelto  -We will obtain a transthoracic echocardiogram for structural evaluation, measurement of ejection fraction, assessment of regional wall motion abnormalities or valvular disease, and further evaluation of hemodynamics   -Check HgbA1c and lipid panel  -Start statin if appropriate based on LDL  -Hold HCTZ given soft BP and in anticipation of dye load tomorrow  -Continue Losartan with parameters  -Continuous telemetry monitoring while admitted  -Monitor electrolytes, replete for K <4 and Mg <2    Huang Campo MD

## 2025-01-12 NOTE — H&P (VIEW-ONLY)
Inpatient consult to Cardiology  Consult performed by: Sanjana Daugherty, APRN-CNP  Consult ordered by: Nina Burns PA-C  Reason for consult: chest pain      HPI:  Astrid Travis is a 69 y.o. female, with a PMH of HTN, pAF on Xarelto, hypercoagulable state d/t MTHFR mutation w/ hx of DVT/PE, pericardial effusion s/p pericardial window 2001, depression, and OA, who presented to [facility] on 1/12/2025 for chest pain and ANTHONY. Patient reports that she was shopping at [employer] today with her  when she developed chest pressure with associated shortness of breath that was worse with mild exertion and deep breaths. She describes the pain as a heavy pressure, like someone sitting on her chest. The pain is not reproducible on exam. Initially the pain resolved with rest but returned a short time later and did not go away.    She did recently have COVID at end of December but states the pain is different to that of what she felt with coughing. She no longer has a cough. Patient denies fever, chills, palpations, leg edema, SOB, cough, abd pain, urinary sx, bloody vomit or stools, headaches, lightheadedness or syncope, weakness, or trauma/travel/sick contacts.    Patient does not see a cardiologist regularly. Her last cardiac evaluation was in 2022 when she had an abnormal stress test as part of a long-COVID evaluation. Subsequent coronary CTA was negative and patient was instructed to start a statin and FU with cardiology but it does not appear she is taking this nor did she see a cardiologist.     ED course notable for stable VS, unremarkable labs including negative troponin, no EKG changes. CXR shows enlarged heart with borderline prominent central pulmonary vasculature.    Cardiology is consulted for chest pain.     Home CV Medications:  Xarelto 20mg daily in the AM  Losartan-HCTZ 100-25mg daily    Patient History   Past Medical History:  She has a past medical history of Acute bronchitis  (05/04/2023), Baker's cyst, ruptured (05/04/2023), Chondromalacia of left patella (05/04/2023), Dietary counseling and surveillance (01/04/2019), Left knee pain (05/04/2023), Other acute postprocedural pain, Other conditions influencing health status (01/29/2019), Other pericardial effusion (noninflammatory) ([facility]) (08/20/2018), Personal history of other diseases of the circulatory system, Personal history of other diseases of the respiratory system, Personal history of other specified conditions (03/13/2019), Personal history of other specified conditions, Right knee pain (05/04/2023), Unspecified atrial fibrillation (Multi), Unspecified diastolic (congestive) heart failure, and Uveitis.  Past Surgical History:  She has a past surgical history that includes Other surgical history (05/30/2013); Other surgical history (04/16/2019); Other surgical history (01/04/2019); Other surgical history (07/18/2018); Hysteroscopy (07/18/2018); Oophorectomy (08/20/2018); Other surgical history (08/20/2018); and CT angio coronary art with heartflow if score >30% (5/5/2022).  Social History:  She reports that she has never smoked. She has never used smokeless tobacco. She reports current alcohol use. She reports that she does not use drugs.  Family History: family history includes Alzheimer's disease in her father and mother; Atrial fibrillation in her father; Blood clot in her father; Breast cancer in her paternal grandmother; Breast cancer (age of onset: 65) in her mother's sister; Hypertension in her father and mother.     Allergies: Nyquil and Shellfish containing products    ROS: 10-point review of systems was performed and is otherwise negative except as noted in HPI.     Outpatient Medications:  Current Outpatient Medications   Medication Instructions    albuterol 90 mcg/actuation inhaler 2 puffs, inhalation, Every 6 hours PRN    cholecalciferol (VITAMIN D3) 50 mcg, Daily    ferrous sulfate 65 mg, Every morning     losartan-hydrochlorothiazide (Hyzaar) 100-25 mg tablet 1 tablet, oral, Daily    rivaroxaban (Xarelto) 20 mg tablet TAKE 1 TABLET BY MOUTH ONCE DAILY       Cardiovascular Testing:  EKG:   ECG 12 Lead 25      Echo:  Transthoracic Echocardiogram 3/2022  1. The left ventricular systolic function is normal with a 65% estimated ejection fraction.     Stress Test:  Nuclear Stress Test 2022  1. Abnormal stress myocardial perfusion imaging in response to pharmacologic stress concerning for mid to distal anterior wall  perfusion concerning for ischemia. Findings discussed with Francisca Bedoya CNP.  2. Well-maintained left ventricular function.    Cardiac Imaging:   CT Angio Heart 2022  1. Widely patent coronary arteries without evidence of obstructive stenosis.  2. The distal LAD was not completely visualized.  3. Previously noted findings the left lower lobe which could not be completely evaluated given the restricted field-of-view.      Diagnostic Results   Labs  CBC- 2025:  2:50 AM  8.1 13.1 414    39.6      BMP- 2025:  2:50 AM  140 11 105 95   3.8 0.80 28    Estimated Creatinine Clearance: 78.3 mL/min (by C-G formula based on SCr of 0.8 mg/dL).     CA: 9.4 PROTIEN: 6.3 ALT: 15 Total Bili: 0.6 M.84   PHOS: _ ALBUMIN: 4.0 AST: 16   Alk Phos: 68      COAGS- 2025:  2:50 AM  1.0   11.5 39     CV Labs  Troponin I, High Sensitivity   Date/Time Value Ref Range Status   2025 04:04 AM <3 0 - 13 ng/L Final   2025 02:50 AM 3 0 - 13 ng/L Final     BNP   Date/Time Value Ref Range Status   2025 02:50 AM 78 0 - 99 pg/mL Final   10/19/2022 09:20 AM 71 0 - 99 pg/mL Final   2022 07:56 AM 29 0 - 99 pg/mL Final     Hemoglobin A1C   Date/Time Value Ref Range Status   10/15/2024 10:27 AM 5.4 See comment % Final   10/19/2022 09:20 AM 5.3 % Final   2022 07:56 AM 5.7 (A) % Final     LDL Calculated   Date/Time Value Ref Range Status   2023 12:15 PM 99 <=99 mg/dL Final     Pertinent  "Imaging  XR chest 1 view 01/12/2025  Enlarged heart with borderline prominent central pulmonary vasculature. Blunting left costophrenic angle.    CT angio chest for pulmonary embolism 01/12/2025  1. No pulmonary embolism.  2. Small peripheral patchy areas of airspace consolidation in the left lower lobe with accompanying areas of atelectasis in the lower lobes. Correlate clinically for possible small areas of pneumonia.  3. Coronary artery calcifications.  4. Small hiatal hernia.         Last Recorded Vitals:  Vitals:    01/12/25 0900 01/12/25 0915 01/12/25 0930 01/12/25 1000   BP: 100/62 106/81 99/56    Pulse: 65 72 65 69   Resp: 13 20 14 18   Temp:       TempSrc:       SpO2: 97% 100% 96% 98%   Weight:       Height:         Temperature:  [36.9 °C (98.5 °F)] 36.9 °C (98.5 °F)  Heart Rate:  [65-74] 69  Respirations:  [12-21] 18  BP: ()/(50-84) 99/56     Last I/O:  No intake/output data recorded.    Physical Exam:   Vitals and nursing notes reviewed.  BP 99/56   Pulse 69   Temp 36.9 °C (98.5 °F) (Temporal)   Resp 18   Ht 1.626 m (5' 4\")   Wt 99.8 kg (220 lb)   SpO2 98% Comment: BENDS FINGER  BMI 37.76 kg/m²   GENERAL: Alert and awake, cooperative; in no acute distress  SKIN: Warm and dry, cap refill <2  HEENT: Normocephalic, PEERL, mucous membranes pink and moist  NECK: No JVD or hepatojugular reflex  CARDIAC: Regular rate and rhythm, S1S2, no murmurs or abnormal heart sounds  CHEST: Normal respiratory effort, no abnormal breath sounds  ABDOMEN: Soft, non-distended, non-tender with palpation  EXTREMITIES: No lower extremity edema, normal pulses all 4 extremities  NEURO: Alert and oriented, mental status at baseline, no focal deficits  PSYCH: Behavior and affect as expected     Tele: NSR w/ 1st degree AVB HR 70-80s  Code Status: Full Code    Assessment/Plan   Astrid Travis is a 69 y.o. female, with a PMH of HTN, pAF on Xarelto, hypercoagulable state d/t MTHFR mutation w/ hx of DVT/PE, pericardial " effusion s/p pericardial window 2001, depression, and OA, who presented to [facility] on 1/12/2025 for chest pain and ANTHONY. Pain is described as a heavy pressure, like someone sitting on her chest, that get worse with mild exertion and did not go away with rest. ED course notable for stable VS, unremarkable labs including negative troponin, no EKG changes. CXR shows enlarged heart with borderline prominent central pulmonary vasculature. Cardiology is consulted for chest pain. CP is now improved s/p Morphine and Dilaudid in the ED.    Home CV Medications: Xarelto 20mg daily in the AM, Losartan-HCTZ 100-25mg daily    #Stable Angina- Chest pain concerning for stable angina and patient w/ multiple risk factors [HTN, clotting disorder, obesity]  -CTA chest negative for PE  #HTN- BP currently soft, may need to adjust home medications  #HLD- Not currently on statin therapy  pAF- Currently NS, not on BB d/t hx of bradycardia, on Xarelto for AC  #Clotting Disorder- c/w Xarelto      RECOMMENDATIONS:  -Plan for [facility] tomorrow given description of stable angina and multiple CV risk factors  -NPO at 0000  -Hold Xarelto  -We will obtain a transthoracic echocardiogram for structural evaluation, measurement of ejection fraction, assessment of regional wall motion abnormalities or valvular disease, and further evaluation of hemodynamics   -Check HgbA1c and lipid panel  -Start statin if appropriate based on LDL  -Hold HCTZ given soft BP and in anticipation of dye load tomorrow  -Continue Losartan with parameters  -Continuous telemetry monitoring while admitted  -Monitor electrolytes, replete for K <4 and Mg <2      CARON Lares-CNP  Advanced Practice Provider  Cardiology  [facility]  01/12/25 2:48 PM     ==========================  Attending note  ==========================  Both the LINDA and I have had a face to face encounter with the patient today. I have examined the patient and edited the  documented physical examination as necessary.  I personally reviewed the patient's recent labs, medications, orders, EKGs, and pertinent cardiac imaging.  I have reviewed the LINDA's encounter note, approve the LINDA's documentation and have edited the note to reflect the diagnostic and therapeutic plan.    69-year-old female with a history of hypertension, paroxysmal atrial fibrillation on Xarelto, hypercoagulable state due to MTHFR mutation with a history of DVT/PE, pericardial effusion status post pericardial window in 2001, depression, and osteoarthritis presented to [facility] on 1/12/2025 for chest pain and dyspnea on exertion. She developed chest pressure and shortness of breath while shopping, described as a heavy sensation like someone sitting on her chest, worse with exertion and deep breaths but not reproducible on exam. Symptoms initially improved with rest but returned persistently. She recently had COVID in late December but describes this pain as different from her prior cough-related discomfort. Denies fever, chills, palpitations, leg edema, abdominal pain, urinary symptoms, hemoptysis, headaches, lightheadedness, syncope, weakness, trauma, travel, or sick contacts. She does not follow with a cardiologist and had an abnormal stress test in 2022 during a long-COVID evaluation, with a subsequent negative coronary CTA. Coronary CTA showed wildly patent arteries but the distal LAD is poorly opacified and luminal patency cannot be conclusively determined. At that point statin therapy was recommended, though it appears she did not start it or follow up. ED evaluation revealed stable vital signs, unremarkable labs including negative troponins, no EKG changes, and CXR showing an enlarged heart with borderline prominent central pulmonary vasculature. Cardiology consulted for chest pain evaluation.    Past medical history:  As above.    Medications were reviewed.    Allergies were reviewed.    Vital  "signs, telemetry, medications, labs, and imaging were reviewed as well.    Physical Exam:   Vitals and nursing notes reviewed.  BP 99/56   Pulse 69   Temp 36.9 °C (98.5 °F) (Temporal)   Resp 18   Ht 1.626 m (5' 4\")   Wt 99.8 kg (220 lb)   SpO2 98% Comment: BENDS FINGER  BMI 37.76 kg/m²   GENERAL: Alert and awake, cooperative; in no acute distress  SKIN: Warm and dry, cap refill <2  HEENT: Normocephalic, PEERL, mucous membranes pink and moist  NECK: No JVD or hepatojugular reflex  CARDIAC: Regular rate and rhythm, S1S2, no murmurs or abnormal heart sounds  CHEST: Normal respiratory effort, no abnormal breath sounds  ABDOMEN: Soft, non-distended, non-tender with palpation  EXTREMITIES: No lower extremity edema, normal pulses all 4 extremities  NEURO: Alert and oriented, mental status at baseline, no focal deficits  PSYCH: Behavior and affect as expected     Tele: NSR w/ 1st degree AVB HR 70-80s  Code Status: Full Code    Assessment/Plan   Astrid Travis is a 69 y.o. female, with a PMH of HTN, pAF on Xarelto, hypercoagulable state d/t MTHFR mutation w/ hx of DVT/PE, pericardial effusion s/p pericardial window 2001, depression, and OA, who presented to [facility] on 1/12/2025 for chest pain and ANTHONY. Pain is described as a heavy pressure, like someone sitting on her chest, that get worse with mild exertion and did not go away with rest. ED course notable for stable VS, unremarkable labs including negative troponin, no EKG changes. CXR shows enlarged heart with borderline prominent central pulmonary vasculature. Cardiology is consulted for chest pain. CP is now improved s/p Morphine and Dilaudid in the ED.    Home CV Medications: Xarelto 20mg daily in the AM, Losartan-HCTZ 100-25mg daily    #Stable Angina- Chest pain concerning for stable angina and patient w/ multiple risk factors [HTN, clotting disorder, obesity]  -CTA chest negative for PE  #HTN- BP currently soft, may need to adjust home " medications  #HLD- Not currently on statin therapy  pAF- Currently NS, not on BB d/t hx of bradycardia, on Xarelto for AC  #Clotting Disorder- c/w Xarelto    RECOMMENDATIONS:  -Plan for [place] tomorrow given description of stable angina and multiple CV risk factors  -NPO at 0000  -Hold Xarelto  -We will obtain a transthoracic echocardiogram for structural evaluation, measurement of ejection fraction, assessment of regional wall motion abnormalities or valvular disease, and further evaluation of hemodynamics   -Check HgbA1c and lipid panel  -Start statin if appropriate based on LDL  -Hold HCTZ given soft BP and in anticipation of dye load tomorrow  -Continue Losartan with parameters  -Continuous telemetry monitoring while admitted  -Monitor electrolytes, replete for K <4 and Mg <2    Huang Campo MD

## 2025-01-12 NOTE — H&P
History of present illness:  Astrid Travis is a 69 y.o. female with PMH of MDD, HTN, CVD, A-fib on Xarelto, hypercoagulable state d/t MTHFR mutation w/ hx of DVT/PE, pericardial effusion s/p pericardial window 2001, and CAD presenting to the hospital for chest pain. She reports walking around [employer] yesterday when the chest pain started. She states the chest pain is in the center of her chest and feels like she has a diana stabbing her from front to back. Initially, the chest pain went away with rest, but returned a short time later even at rest. She reports associated SOB. She states every time she tries to take a deep breath the chest pain is worse. Of note she recently was diagnosed with COVID on the Dec. 29th with associated cough. She was taking albuterol for the cough up until Friday. She states she did have rib pain from the coughing, but no chest pain like she has now. Denies fever, chills, papulations, diaphoresis, dizziness, jaw pain, abdominal pain, nausea, or vomiting. She has not missed any doses of her Xarelto.    Upon chart review in 2022 patient had an abnormal stress test with a CTA to follow up. The distal LAD was not completely visualized on CTA which was the area of concern on the stress test; however, all other coronary arteries were widely patent and patient was asymptomatic. Patient was instructed to start a high intensity statin and follow up with cardiology in 3 months. No further documentation implies patient started the stain. Patient only vaguely remembers these two tests.     Past Medical History:  05/04/2023: Acute bronchitis  05/04/2023: Baker's cyst, ruptured  05/04/2023: Chondromalacia of left patella  01/04/2019: Dietary counseling and surveillance      Comment:  Pre-bariatric surgery nutrition evaluation  05/04/2023: Left knee pain  No date: Other acute postprocedural pain      Comment:  Acute post-operative pain  01/29/2019: Other conditions influencing health status      Comment:   Prevention of blood clots  08/20/2018: Other pericardial effusion (noninflammatory) ([facility]      Comment:  Pericardial effusion  No date: Personal history of other diseases of the circulatory system      Comment:  History of hypertension  No date: Personal history of other diseases of the respiratory system      Comment:  History of bronchitis  03/13/2019: Personal history of other specified conditions      Comment:  History of chronic cough  No date: Personal history of other specified conditions      Comment:  History of postoperative nausea and vomiting  05/04/2023: Right knee pain  No date: Unspecified atrial fibrillation (Multi)      Comment:  Atrial fibrillation  No date: Unspecified diastolic (congestive) heart failure      Comment:  Diastolic congestive heart failure  No date: Uveitis     Full ROS done and negative except as stated above.      Surgical History:  She has a past surgical history that includes Other surgical history (05/30/2013); Other surgical history (04/16/2019); Other surgical history (01/04/2019); Other surgical history (07/18/2018); Hysteroscopy (07/18/2018); Oophorectomy (08/20/2018); Other surgical history (08/20/2018); and CT angio coronary art with heartflow if score >30% (5/5/2022).    Social History     Socioeconomic History    Marital status:    Tobacco Use    Smoking status: Never    Smokeless tobacco: Never   Substance and Sexual Activity    Alcohol use: Yes     Comment: Social    Drug use: Never    Sexual activity: Defer       Family History   Problem Relation Name Age of Onset    Alzheimer's disease Mother      Hypertension Mother      Alzheimer's disease Father      Atrial fibrillation Father      Hypertension Father      Blood clot Father      Breast cancer Paternal Grandmother          ?age    Breast cancer Mother's Sister  65        Home meds:  Current Outpatient Medications   Medication Instructions    albuterol 90 mcg/actuation inhaler 2 puffs, inhalation, Every  "6 hours PRN    cholecalciferol (VITAMIN D3) 50 mcg, Daily    ferrous sulfate 65 mg, Every morning    losartan-hydrochlorothiazide (Hyzaar) 100-25 mg tablet 1 tablet, oral, Daily    pediatric multivitamin no.76 (Flintstones Complete) tablet,chewable Chew.    rivaroxaban (Xarelto) 20 mg tablet TAKE 1 TABLET BY MOUTH ONCE DAILY    VITAMIN B COMPLEX ORAL Take by mouth.       Vitals (Last 24 Hours):  Heart Rate:  [66-74]   Temperature:  [36.9 °C (98.5 °F)]   Respirations:  [12-21]   BP: (103-143)/(50-84)   Height:  [162.6 cm (5' 4\")]   Weight:  [99.8 kg (220 lb)]   Pulse Ox:  [94 %-99 %]      PHYSICAL EXAM:  Constitutional: NAD, alert and cooperative  Eyes: no icterus  ENMT: mucous membranes moist, no lesions  Head/Neck: supple  Respiratory/Thorax: CTA bilaterally, non-labored breathing, no cough, on RA  Cardiovascular: RRR, no murmurs heard  Gastrointestinal: ND/S/NT  : no Barker, no SP/flank discomfort  Musculoskeletal: no joint swelling, ROM intact  Extremities: edema bilat  Neurological: non-focal  Skin: warm and dry  Psych: calm, stable mood     MEDS:  cholecalciferol, 2,000 Units, oral, Daily  ferrous sulfate (325 mg ferrous sulfate), 65 mg of iron, oral, q AM  losartan 100 mg, hydroCHLOROthiazide 25 mg for Hyzaar 100 mg-25 mg, , oral, Daily  pantoprazole, 40 mg, oral, Daily before breakfast   Or  pantoprazole, 40 mg, intravenous, Daily before breakfast  rivaroxaban, 20 mg, oral, Daily  Study NIH RECOVER ([id number]) Understanding Long COVID-19 gadoterate meglumine, 0.2 mL/kg (Order-Specific), intravenous, Once in imaging              PRN medications: acetaminophen **OR** acetaminophen **OR** acetaminophen, albuterol, ondansetron **OR** ondansetron      I have reviewed all imaging reports and labs pertinent to this visit    ASSESSMENT/PLAN:  Astrid Travis is a 69 y.o. female with PMH of MDD, HTN, CVD, A-fib on Xarelto, and CAD presenting to the hospital for chest pain. She reports walking around [place] yesterday " when the chest pain started. She states the chest pain is in the center of her chest and feels like she has a stick stabbing her from front to back. Initially, the chest pain went away with rest, but returned a short time later even at rest. She reports associated SOB. She states every time she tries to take a deep breath the chest pain is worse. Of note she recently was diagnosed with COVID on the 29th with associated cough. She was taking albuterol for the cough up until Friday. She states she did have rib pain from the coughing, but no chest pain like she has now. Denies fever, chills, palpitations, diaphoresis, dizziness, jaw pain, abdominal pain, nausea, or vomiting. She has not missed any doses of her Xarelto.    Upon chart review in 2022 patient had an abnormal stress test with a CTA to follow up. The distal LAD was not completely visualized on CTA which was the area of concern on the stress test; however, all other coronary arteries were widely patent and patient was asymptomatic. Patient was instructed to start a high intensity statin and follow up with cardiology in 3 months. No further documentation implies patient started the stain. Patient only vaguely remembers these two tests.     Chest pain  -chest not TTP on exam  -Trop: 3,3  -BNP: 78  -LDL: 74, A1C pending  -CXR: enlarged heart with borderline prominent central pulmonary vasculature. Blunting left costophrenic angle.   -CT PE: Negative for PE, small peripheral patchy areas of airspace consolidation in the left lower lobe with accompanying areas of atelectasis in the lower lobes.   -echo pending  -Continue ASA 81 mg daily  -telemetry  -[facility] tomorrow, NPO at midnight, Xarelto on hold  -cardiology following    HTN  -continue losartan 100 mg daily, hold for systolic less than 110    Hx of A-fib  Hx of unprovoked DVTs  -Continue xarelto 20 mg     Other comorbidities as above  -continue medications as ordered and adjust based on clinical course     VTE /  GI prophylaxis   -Xarelto on hold due to [facility] tomorrow, PPI     Discharge planning  -HNN when med ready     Discussed with Dr. Santo and the interdisciplinary team     Nina Burns PA-C

## 2025-01-12 NOTE — PROGRESS NOTES
Pharmacy Medication History Review   Spoke to the patient, Pt on Xarelto.    Astrid Travis is a 69 y.o. female admitted for Chest pain. Pharmacy reviewed the patient's [id number] medications and allergies for accuracy.    The list below reflectives the updated PTA list. Please review each medication in order reconciliation for additional clarification and justification.     Prior to Admission Medications   Prescriptions Last Dose Informant              cholecalciferol (Vitamin D3) 50 MCG (2000 UT) tablet 1/11/2025    Sig: Take 1 tablet (50 mcg) by mouth once daily.   ferrous sulfate 325 (65 Fe) MG EC tablet 1/11/2025    Sig: Take 65 mg by mouth once daily in the morning. Do not crush, chew, or split.   losartan-hydrochlorothiazide (Hyzaar) 100-25 mg tablet 1/11/2025    Sig: Take 1 tablet by mouth once daily.   rivaroxaban (Xarelto) 20 mg tablet 1/11/2025    Sig: TAKE 1 TABLET BY MOUTH ONCE DAILY      Facility-Administered Medications Last Administration Doses Remaining   Study NIH RECOVER ([id number]) Understanding Long COVID-19 gadoterate meglumine (DOTAREM) contrast injection 20 mL None recorded 1          The list below reflectives the updated allergy list. Please review each documented allergy for additional clarification and justification.  Allergies  Reviewed by Vicky Pollock on 1/12/2025        Severity Reactions Comments    Nyquil Not Specified Hives     Shellfish Containing Products Not Specified Hives             Below are additional concerns with the patient's PTA list.      Vicky Pollock

## 2025-01-12 NOTE — ED PROVIDER NOTES
"Chief Complaint   Patient presents with    Chest Pain     Per patient positive for covid 4 weeks ago today presenting with CP and SOB      HPI:   Astrid Travis is an 69 y.o. female with complicated PMH that includes DVT and A-fib (on Xarelto), CAD, anemia, SAPPHIRE (on CPAP), HTN, HLD, MDD, long COVID who presents to the ED with her  for evaluation of chest pain and shortness of breath.  Patient reports that pain started around 1300 today while she was walking around [employer].  Localizes it to the middle of her chest.  Radiates across lower chest into both sides.  Pain is worse with deep breathing.  Rates it 10/10.  His not taken any medication for the pain.  Describes it as \"a pressure.  Like there is a stick poking through my chest into my back\".  She said pain lasted for a little while and went away with rest.  After she laid down when she got back up pain resumed and has not gone away.  She endorses associated headache and nausea.  Last took her Xarelto on the 11th at 0800.  Takes it once daily.  Does note that she tested positive for COVID on the Sunday after Meghan.  She denies any leg swelling, hemoptysis, palpitations, dizziness or lightheadedness, syncope, jaw pain, abdominal pain, numbness, tingling, extremity weakness.  Is not on hormone therapy.    Medications:  Soc HX: Former smoker  Allergies   Allergen Reactions    Nyquil Hives    Shellfish Containing Products Hives   :  Past Medical History:   Diagnosis Date    Acute bronchitis 05/04/2023    Baker's cyst, ruptured 05/04/2023    Chondromalacia of left patella 05/04/2023    Dietary counseling and surveillance 01/04/2019    Pre-bariatric surgery nutrition evaluation    Left knee pain 05/04/2023    Other acute postprocedural pain     Acute post-operative pain    Other conditions influencing health status 01/29/2019    Prevention of blood clots    Other pericardial effusion (noninflammatory) ([facility]) 08/20/2018    Pericardial effusion    Personal " history of other diseases of the circulatory system     History of hypertension    Personal history of other diseases of the respiratory system     History of bronchitis    Personal history of other specified conditions 03/13/2019    History of chronic cough    Personal history of other specified conditions     History of postoperative nausea and vomiting    Right knee pain 05/04/2023    Unspecified atrial fibrillation (Multi)     Atrial fibrillation    Unspecified diastolic (congestive) heart failure     Diastolic congestive heart failure    Uveitis      Past Surgical History:   Procedure Laterality Date    CT ANGIO CORONARY ART WITH HEARTFLOW IF SCORE >30%  5/5/2022    CT HEART CORONARY ANGIOGRAM 5/5/2022 [facility] ANCILLARY LEGAC[facility]    HYSTEROSCOPY  07/18/2018    Hysteroscopy With Endometrial Ablation    OOPHORECTOMY  08/20/2018    Oophorectomy    OTHER SURGICAL HISTORY  05/30/2013    Atrial Cardioversion    OTHER SURGICAL HISTORY  04/16/2019    Radiofrequency ablation    OTHER SURGICAL HISTORY  01/04/2019    Josefina-en-Y gastric bypass    OTHER SURGICAL HISTORY  07/18/2018    Endometrial Biopsy By Hysteroscopy    OTHER SURGICAL HISTORY  08/20/2018    Creation Of Pericardial Window     Family History   Problem Relation Name Age of Onset    Alzheimer's disease Mother      Hypertension Mother      Alzheimer's disease Father      Atrial fibrillation Father      Hypertension Father      Blood clot Father      Breast cancer Paternal Grandmother          ?age    Breast cancer Mother's Sister  65      Physical Exam  Vitals and nursing note reviewed.   Constitutional:       General: She is not in acute distress.     Appearance: Normal appearance. She is not ill-appearing or toxic-appearing.      Comments: Elevated BMI.  Tearful   HENT:      Right Ear: External ear normal.      Left Ear: External ear normal.   Eyes:      Pupils: Pupils are equal, round, and reactive to light.   Neck:      Vascular: No JVD.   Cardiovascular:       Rate and Rhythm: Normal rate and regular rhythm.      Pulses: Normal pulses.           Radial pulses are 2+ on the right side and 2+ on the left side.        Dorsalis pedis pulses are 2+ on the right side and 2+ on the left side.      Heart sounds: Normal heart sounds. No murmur heard.     Comments: Negative Joshua bilaterally  Pulmonary:      Effort: Pulmonary effort is normal. No respiratory distress.      Breath sounds: Normal breath sounds.   Chest:      Chest wall: No mass or tenderness.   Abdominal:      General: Bowel sounds are normal.      Palpations: Abdomen is soft.      Tenderness: There is no abdominal tenderness. There is no guarding or rebound.   Musculoskeletal:         General: No deformity or signs of injury. Normal range of motion.      Cervical back: Normal range of motion.   Lymphadenopathy:      Cervical: No cervical adenopathy.   Skin:     General: Skin is warm and dry.      Capillary Refill: Capillary refill takes less than 2 seconds.      Findings: No bruising.   Neurological:      Mental Status: She is alert.      Cranial Nerves: No cranial nerve deficit.   Psychiatric:         Mood and Affect: Mood is anxious.      VS: As documented in the triage note and EMR flowsheet from this visit were reviewed.    External Records Reviewed: I reviewed recent and relevant outside records including: Reviewed urgent care provider note 12/29/2024.  Patient seen for flulike symptoms.  Subsequently tested positive for COVID.  Could not take Paxlovid due to her Xarelto.    EKG INTERPRETATION:      Personally Reviewed      Rhythm: Sinus rhythm with first-degree AV block     Rate: 74 bpm      Axis: Normal axis      Intervals: Prolonged  ms     QRS Complex:  Normal      ST Segment:  Normal ST-T segments      QT Interval: QTc 430 ms     Compared with Prior: Similar       Medical Decision Making:   ED Course as of 01/12/25 0557   Sun Jan 12, 2025   0237 Vitals Reviewed: Afebrile. Hypertensive. Not  tachycardic nor tachypneic. No hypoxia.   [KA]   0328 Patient is 69-year-old female who presents to the ED for evaluation of chest pain.  On exam she is anxious and tearful.  Vital signs are normal.  Heart rate is regular.  Lungs clear.  Negative Joshua bilaterally.  Has not missed any doses of her Xarelto.  Heart score is a 4 without troponin due to age and risk factors.  Will obtain cardiac workup.  Patient to be given morphine and Zofran. [KA]   0458 IMPRESSION:  Enlarged heart with borderline prominent central pulmonary  vasculature. Blunting left costophrenic angle.   [KA]   0458 I personally viewed labs.  Initial troponin 3, delta troponin undetectable.  CMP without abnormalities.  BNP normal.  Magnesium normal.  Coags normal.  CBC normal. [KA]   0575 Discussed results with patient.  She is still tearful.  Says that her chest pain was a 6 after morphine now its back up to a 10.  Because pain is not improving and heart score is a 5, I offered admission.  Patient would like to be admitted.  Pressures are little soft.  Will give dose of IV Dilaudid. [KA]      ED Course User Index  [KA] Yeimi Sommers PA-C         Diagnoses as of 01/12/25 0557   Chest pain, unspecified type   Pleural effusion, left      Escalation of Care: Appropriate for hospitalization       Discussion of Management with Other Providers:  I discussed the patient/results with: Attending Gopal    Counseling: Spoke with the patient and discussed today´s findings, in addition to providing specific details for the plan of care and expected course.  Patient was given the opportunity to ask questions.    Educated on the common potential side effects of medications prescribed.  The plan of care was mutually agreed upon with the patient. The patient and/or family were given the opportunity to ask questions. All questions asked today in the ED were answered to the best of my ability with today's information.    This patient was cared for in the setting  of nationwide stress on resources and staffing.    This report was transcribed using voice recognition software.  Every effort was made to ensure accuracy, however, inadvertently computerized transcription errors may be present.       Yeimi Sommers PA-C  01/12/25 2025

## 2025-01-13 ENCOUNTER — APPOINTMENT (OUTPATIENT)
Dept: CARDIOLOGY | Facility: HOSPITAL | Age: 70
End: 2025-01-13
Payer: MEDICARE

## 2025-01-13 PROBLEM — I20.89 STABLE ANGINA (CMS-HCC): Status: ACTIVE | Noted: 2025-01-12

## 2025-01-13 LAB
ANION GAP SERPL CALC-SCNC: 10 MMOL/L (ref 10–20)
AORTIC VALVE MEAN GRADIENT: 3 MMHG
AORTIC VALVE PEAK VELOCITY: 1.18 M/S
ATRIAL RATE: 74 BPM
AV PEAK GRADIENT: 6 MMHG
AVA (PEAK VEL): 2.08 CM2
AVA (VTI): 1.94 CM2
BUN SERPL-MCNC: 12 MG/DL (ref 6–23)
CALCIUM SERPL-MCNC: 8.6 MG/DL (ref 8.6–10.3)
CARDIAC TROPONIN I PNL SERPL HS: 4 NG/L (ref 0–13)
CHLORIDE SERPL-SCNC: 107 MMOL/L (ref 98–107)
CO2 SERPL-SCNC: 28 MMOL/L (ref 21–32)
CREAT SERPL-MCNC: 0.87 MG/DL (ref 0.5–1.05)
EGFRCR SERPLBLD CKD-EPI 2021: 73 ML/MIN/1.73M*2
EJECTION FRACTION APICAL 4 CHAMBER: 74.3
EJECTION FRACTION: 72 %
ERYTHROCYTE [DISTWIDTH] IN BLOOD BY AUTOMATED COUNT: 12.1 % (ref 11.5–14.5)
EST. AVERAGE GLUCOSE BLD GHB EST-MCNC: 111 MG/DL
GLUCOSE SERPL-MCNC: 84 MG/DL (ref 74–99)
HBA1C MFR BLD: 5.5 %
HCT VFR BLD AUTO: 36.6 % (ref 36–46)
HGB BLD-MCNC: 12 G/DL (ref 12–16)
LEFT ATRIUM VOLUME AREA LENGTH INDEX BSA: 25.4 ML/M2
LEFT VENTRICLE INTERNAL DIMENSION DIASTOLE: 4.02 CM (ref 3.5–6)
LEFT VENTRICULAR OUTFLOW TRACT DIAMETER: 1.99 CM
MAGNESIUM SERPL-MCNC: 2.02 MG/DL (ref 1.6–2.4)
MCH RBC QN AUTO: 31.2 PG (ref 26–34)
MCHC RBC AUTO-ENTMCNC: 32.8 G/DL (ref 32–36)
MCV RBC AUTO: 95 FL (ref 80–100)
MITRAL VALVE E/A RATIO: 1.23
NRBC BLD-RTO: 0 /100 WBCS (ref 0–0)
P AXIS: 62 DEGREES
P OFFSET: 170 MS
P ONSET: 121 MS
PLATELET # BLD AUTO: 328 X10*3/UL (ref 150–450)
POTASSIUM SERPL-SCNC: 4.2 MMOL/L (ref 3.5–5.3)
PR INTERVAL: 210 MS
Q ONSET: 226 MS
QRS COUNT: 12 BEATS
QRS DURATION: 72 MS
QT INTERVAL: 388 MS
QTC CALCULATION(BAZETT): 430 MS
QTC FREDERICIA: 416 MS
R AXIS: 72 DEGREES
RBC # BLD AUTO: 3.85 X10*6/UL (ref 4–5.2)
RIGHT VENTRICLE FREE WALL PEAK S': 13 CM/S
RIGHT VENTRICLE PEAK SYSTOLIC PRESSURE: 22.1 MMHG
SODIUM SERPL-SCNC: 141 MMOL/L (ref 136–145)
T AXIS: 45 DEGREES
T OFFSET: 420 MS
TRICUSPID ANNULAR PLANE SYSTOLIC EXCURSION: 2.6 CM
VENTRICULAR RATE: 74 BPM
WBC # BLD AUTO: 6.4 X10*3/UL (ref 4.4–11.3)

## 2025-01-13 PROCEDURE — G0378 HOSPITAL OBSERVATION PER HR: HCPCS

## 2025-01-13 PROCEDURE — 80048 BASIC METABOLIC PNL TOTAL CA: CPT | Performed by: INTERNAL MEDICINE

## 2025-01-13 PROCEDURE — 93306 TTE W/DOPPLER COMPLETE: CPT

## 2025-01-13 PROCEDURE — 85027 COMPLETE CBC AUTOMATED: CPT | Performed by: INTERNAL MEDICINE

## 2025-01-13 PROCEDURE — 83735 ASSAY OF MAGNESIUM: CPT | Performed by: NURSE PRACTITIONER

## 2025-01-13 PROCEDURE — 2500000001 HC RX 250 WO HCPCS SELF ADMINISTERED DRUGS (ALT 637 FOR MEDICARE OP): Performed by: INTERNAL MEDICINE

## 2025-01-13 PROCEDURE — 84484 ASSAY OF TROPONIN QUANT: CPT | Performed by: NURSE PRACTITIONER

## 2025-01-13 PROCEDURE — 2500000001 HC RX 250 WO HCPCS SELF ADMINISTERED DRUGS (ALT 637 FOR MEDICARE OP): Performed by: NURSE PRACTITIONER

## 2025-01-13 PROCEDURE — 36415 COLL VENOUS BLD VENIPUNCTURE: CPT | Performed by: INTERNAL MEDICINE

## 2025-01-13 PROCEDURE — 99232 SBSQ HOSP IP/OBS MODERATE 35: CPT

## 2025-01-13 RX ADMIN — ASPIRIN 81 MG 81 MG: 81 TABLET ORAL at 09:47

## 2025-01-13 RX ADMIN — ACETAMINOPHEN 650 MG: 325 TABLET, FILM COATED ORAL at 21:20

## 2025-01-13 SDOH — ECONOMIC STABILITY: FOOD INSECURITY: WITHIN THE PAST 12 MONTHS, THE FOOD YOU BOUGHT JUST DIDN'T LAST AND YOU DIDN'T HAVE MONEY TO GET MORE.: NEVER TRUE

## 2025-01-13 SDOH — SOCIAL STABILITY: SOCIAL INSECURITY: WITHIN THE LAST YEAR, HAVE YOU BEEN HUMILIATED OR EMOTIONALLY ABUSED IN OTHER WAYS BY YOUR PARTNER OR EX-PARTNER?: NO

## 2025-01-13 SDOH — SOCIAL STABILITY: SOCIAL INSECURITY
WITHIN THE LAST YEAR, HAVE YOU BEEN RAPED OR FORCED TO HAVE ANY KIND OF SEXUAL ACTIVITY BY YOUR PARTNER OR EX-PARTNER?: NO

## 2025-01-13 SDOH — ECONOMIC STABILITY: FOOD INSECURITY: WITHIN THE PAST 12 MONTHS, YOU WORRIED THAT YOUR FOOD WOULD RUN OUT BEFORE YOU GOT THE MONEY TO BUY MORE.: NEVER TRUE

## 2025-01-13 SDOH — ECONOMIC STABILITY: INCOME INSECURITY: IN THE PAST 12 MONTHS HAS THE ELECTRIC, GAS, OIL, OR WATER COMPANY THREATENED TO SHUT OFF SERVICES IN YOUR HOME?: NO

## 2025-01-13 SDOH — SOCIAL STABILITY: SOCIAL INSECURITY: WITHIN THE LAST YEAR, HAVE YOU BEEN AFRAID OF YOUR PARTNER OR EX-PARTNER?: NO

## 2025-01-13 SDOH — SOCIAL STABILITY: SOCIAL INSECURITY
WITHIN THE LAST YEAR, HAVE YOU BEEN KICKED, HIT, SLAPPED, OR OTHERWISE PHYSICALLY HURT BY YOUR PARTNER OR EX-PARTNER?: NO

## 2025-01-13 ASSESSMENT — COGNITIVE AND FUNCTIONAL STATUS - GENERAL
DAILY ACTIVITIY SCORE: 24
PATIENT BASELINE BEDBOUND: NO
MOBILITY SCORE: 24

## 2025-01-13 ASSESSMENT — PAIN - FUNCTIONAL ASSESSMENT
PAIN_FUNCTIONAL_ASSESSMENT: 0-10

## 2025-01-13 ASSESSMENT — ACTIVITIES OF DAILY LIVING (ADL)
ADEQUATE_TO_COMPLETE_ADL: UNABLE TO ASSESS
WALKS IN HOME: INDEPENDENT
DRESSING YOURSELF: INDEPENDENT
GROOMING: INDEPENDENT
BATHING: INDEPENDENT
HEARING - LEFT EAR: FUNCTIONAL
LACK_OF_TRANSPORTATION: NO
TOILETING: INDEPENDENT
PATIENT'S MEMORY ADEQUATE TO SAFELY COMPLETE DAILY ACTIVITIES?: UNABLE TO ASSESS
HEARING - RIGHT EAR: FUNCTIONAL
JUDGMENT_ADEQUATE_SAFELY_COMPLETE_DAILY_ACTIVITIES: UNABLE TO ASSESS
FEEDING YOURSELF: INDEPENDENT

## 2025-01-13 ASSESSMENT — PAIN SCALES - GENERAL
PAINLEVEL_OUTOF10: 0 - NO PAIN
PAINLEVEL_OUTOF10: 10 - WORST POSSIBLE PAIN
PAINLEVEL_OUTOF10: 0 - NO PAIN

## 2025-01-13 ASSESSMENT — PAIN DESCRIPTION - DESCRIPTORS: DESCRIPTORS: CRAMPING

## 2025-01-13 NOTE — PROGRESS NOTES
01/13/25 0652   [facility] Disability Status   Are you deaf or do you have serious difficulty hearing? N   Are you blind or do you have serious difficulty seeing, even when wearing glasses? N   Because of a physical, mental, or emotional condition, do you have serious difficulty concentrating, remembering, or making decisions? (5 years old or older) N   Do you have serious difficulty walking or climbing stairs? N   Do you have serious difficulty dressing or bathing? N   Because of a physical, mental, or emotional condition, do you have serious difficulty doing errands alone such as visiting the doctor? N

## 2025-01-13 NOTE — CARE PLAN
The patient's goals for the shift include  remain HDS    The clinical goals for the shift include no complaints of CP and remain HDS      Problem: Pain - Adult  Goal: Verbalizes/displays adequate comfort level or baseline comfort level  Outcome: Progressing     Problem: Safety - Adult  Goal: Free from fall injury  Outcome: Progressing     Problem: Discharge Planning  Goal: Discharge to home or other facility with appropriate resources  Outcome: Progressing     Problem: Chronic Conditions and Co-morbidities  Goal: Patient's chronic conditions and co-morbidity symptoms are monitored and maintained or improved  Outcome: Progressing

## 2025-01-13 NOTE — HOSPITAL COURSE
Astrid Travis is a 69 y.o. female with PMH of MDD, HTN, CVD, A-fib on Xarelto, hypercoagulable state d/t MTHFR mutation w/ hx of DVT/PE, pericardial effusion s/p pericardial window 2001, and CAD presenting to the hospital for chest pain. She reports walking around [employer] yesterday when the chest pain started. She states the chest pain is in the center of her chest and feels like she has a diana stabbing her from front to back. Initially, the chest pain went away with rest, but returned a short time later even at rest. She reports associated SOB. She states every time she tries to take a deep breath the chest pain is worse. Of note she recently was diagnosed with COVID on the Dec. 29th with associated cough. She was taking albuterol for the cough up until Friday. She states she did have rib pain from the coughing, but no chest pain like she has now. Denies fever, chills, papulations, diaphoresis, dizziness, jaw pain, abdominal pain, nausea, or vomiting. She has not missed any doses of her Xarelto.     Upon chart review in 2022 patient had an abnormal stress test with a CTA to follow up. The distal LAD was not completely visualized on CTA which was the area of concern on the stress test; however, all other coronary arteries were widely patent and patient was asymptomatic. Patient was instructed to start a high intensity statin and follow up with cardiology in 3 months. No further documentation implies patient started the stain. Patient only vaguely remembers these two tests.     Observation Course: ***      Discharge weight: ### kg    After all labs and VS were reviewed the decision was made that the patient was medically stable for discharge.  The patient was discharged in satisfactory condition.    More than 30 minutes were spent in coordinating patient discharge.

## 2025-01-13 NOTE — PROGRESS NOTES
Medicine PA follow up note    Subjective:  Patient laying in bed in no acute distress. She reports her chest pain has improved slightly. Still endorses chest pain when taking a deep breath. [facility] planned for tomorrow delayed due to Xarelto dosing yesterday morning.    Vitals (Last 24 Hours):  Heart Rate:  [57-89]   Temp:  [36.1 °C (97 °F)-36.6 °C (97.9 °F)]   Resp:  [9-26]   BP: ()/()   SpO2:  [92 %-100 %]       I have reviewed all imaging reports and labs pertinent to this visit / presenting problem    PHYSICAL EXAM:  Constitutional: NAD, alert and cooperative  Eyes: no icterus  ENMT: mucous membranes moist, no lesions  Head/Neck: supple  Respiratory/Thorax: CTA bilaterally, non-labored breathing, no cough, on RA  Cardiovascular: RRR, no murmurs heard  Gastrointestinal: ND/S/NT  : no Barker, no SP/flank discomfort  Musculoskeletal: no joint swelling, ROM intact  Extremities: no edema  Neurological: non-focal  Skin: warm and dry  Psych: calm, stable mood     MEDS:  Scheduled meds  aspirin, 81 mg, oral, Daily  cholecalciferol, 2,000 Units, oral, Daily  ferrous sulfate (325 mg ferrous sulfate), 65 mg of iron, oral, q AM  losartan, 100 mg, oral, Daily  pantoprazole, 40 mg, oral, Daily before breakfast   Or  pantoprazole, 40 mg, intravenous, Daily before breakfast  perflutren lipid microspheres, 0.5-10 mL of dilution, intravenous, Once in imaging  perflutren protein A microsphere, 0.5 mL, intravenous, Once in imaging  [Held by provider] rivaroxaban, 20 mg, oral, Daily  sulfur hexafluoride microsphr, 2 mL, intravenous, Once in imaging        Continuous meds       PRN meds  PRN medications: acetaminophen **OR** acetaminophen **OR** acetaminophen, albuterol, ondansetron **OR** ondansetron      ASSESSMENT/PLAN:  Astrid Travis is a 69 y.o. female with PMH of MDD, HTN, CVD, A-fib on Xarelto, and CAD presenting to the hospital for chest pain. She reports walking around [employer] yesterday when the chest pain started.  She states the chest pain is in the center of her chest and feels like she has a stick stabbing her from front to back. Initially, the chest pain went away with rest, but returned a short time later even at rest. She reports associated SOB. She states every time she tries to take a deep breath the chest pain is worse. Of note she recently was diagnosed with COVID on the 29th with associated cough. She was taking albuterol for the cough up until Friday. She states she did have rib pain from the coughing, but no chest pain like she has now. Denies fever, chills, palpitations, diaphoresis, dizziness, jaw pain, abdominal pain, nausea, or vomiting. She has not missed any doses of her Xarelto.     Upon chart review in 2022 patient had an abnormal stress test with a CTA to follow up. The distal LAD was not completely visualized on CTA which was the area of concern on the stress test; however, all other coronary arteries were widely patent and patient was asymptomatic. Patient was instructed to start a high intensity statin and follow up with cardiology in 3 months. No further documentation implies patient started the stain. Patient only vaguely remembers these two tests.      Chest pain  -chest not TTP on exam  -Trop: 3,3,4  -BNP: 78  -LDL: 74, A1C: 5.5  -CXR: enlarged heart with borderline prominent central pulmonary vasculature. Blunting left costophrenic angle.   -CT PE: Negative for PE, small peripheral patchy areas of airspace consolidation in the left lower lobe with accompanying areas of atelectasis in the lower lobes.   -echo: EF 72%, mild aortic valve regurgitation   -Continue ASA 81 mg daily  -telemetry  -[facility] tomorrow, NPO at midnight, Xarelto on hold  -cardiology following     HTN  -continue losartan 100 mg daily, hold for systolic less than 110     Hx of A-fib  Hx of unprovoked DVTs  -holding xarelto 20 mg     Other comorbidities as above  -continue medications as ordered and adjust based on clinical course      VTE / GI prophylaxis   -Xarelto on hold due to upcoming cath, PPI, bowel regimen in place     Discharge planning  -[facility]NN when med ready, pending cath results     Discussed with Dr. Santo and the interdisciplinary team     Upgraded from obs to inpatient, hospitalist to assume care, notified via epic secure chat     Nina Burns PA-C

## 2025-01-13 NOTE — PROGRESS NOTES
Transitional Care Coordination Progress Note:  Plan per Medical/Surgical team: treatment of CP & pleural effusion with ECHO pending   Status: Observation   Payor source: medicare A/B  Discharge disposition: Home with    Potential Barriers: vitals & labs normal  ADOD: 1/13/2025  XOCHILT Mendiola RN, BSN Transitional Care Coordinator ED# [phone]     When patient is medically ready for discharge  They are being discharged to: home   will pick patient up  No SNF  No HHC  No DME  No O2  No Wounds     01/13/25 0652   Discharge Planning   Living Arrangements Spouse/significant other   Support Systems Spouse/significant other   Assistance Needed Echo pending   Type of Residence Private residence   Number of Stairs to Enter Residence 2   Number of Stairs Within Residence 8   Home or Post Acute Services None   Expected Discharge Disposition Home   Does the patient need discharge transport arranged? No   Financial Resource Strain   How hard is it for you to pay for the very basics like food, housing, medical care, and heating? Not hard   Housing Stability   In the last 12 months, was there a time when you were not able to pay the mortgage or rent on time? N   In the past 12 months, how many times have you moved where you were living? 1   At any time in the past 12 months, were you homeless or living in a shelter (including now)? N   Transportation Needs   In the past 12 months, has lack of transportation kept you from medical appointments or from getting medications? no   In the past 12 months, has lack of transportation kept you from meetings, work, or from getting things needed for daily living? No   Stroke Family Assessment   Stroke Family Assessment Needed No   Intensity of Service   Intensity of Service 0-30 min

## 2025-01-13 NOTE — PROGRESS NOTES
"Subjective Data:  Left heart cath postponed due to Xarelto dose.   Echo completed- results pending   Remains with intermittent chest pressure     Overnight Events:    N/A     Objective Data:  Last Recorded Vitals:  Vitals:    01/13/25 0630 01/13/25 0700 01/13/25 0809 01/13/25 0945   BP: 131/76 125/73 122/87 137/63   BP Location:   Left arm Left arm   Patient Position:   Sitting Lying   Pulse: 59 63 68 70   Resp: 16 (!) 26 20 17   Temp:    36.1 °C (97 °F)   TempSrc:    Temporal   SpO2: 99% 98% 98% 99%   Weight:       Height:           Last Labs:  LABS:  CMP:  Results from last 7 days   Lab Units 01/13/25  0500 01/12/25  0250   SODIUM mmol/L 141 140   POTASSIUM mmol/L 4.2 3.8   CHLORIDE mmol/L 107 105   CO2 mmol/L 28 28   ANION GAP mmol/L 10 11   BUN mg/dL 12 11   CREATININE mg/dL 0.87 0.80   EGFR mL/min/1.73m*2 73 81   MAGNESIUM mg/dL 2.02 1.84   ALBUMIN g/dL  --  4.0   ALT U/L  --  15   AST U/L  --  16   BILIRUBIN TOTAL mg/dL  --  0.6     CBC:  Results from last 7 days   Lab Units 01/13/25  0500 01/12/25  0250   WBC AUTO x10*3/uL 6.4 8.1   HEMOGLOBIN g/dL 12.0 13.1   HEMATOCRIT % 36.6 39.6   PLATELETS AUTO x10*3/uL 328 414   MCV fL 95 94     COAG:   Results from last 7 days   Lab Units 01/12/25  0250   INR  1.0     ABO: No results found for: \"ABO\"  HEME/ENDO:  Results from last 7 days   Lab Units 01/12/25  0404 01/12/25  0250   TSH mIU/L 2.75  --    HEMOGLOBIN A1C %  --  5.5      CARDIAC:   Results from last 7 days   Lab Units 01/13/25  0500 01/12/25  0404 01/12/25  0250   TROPHS ng/L 4 <3 3   BNP pg/mL  --   --  78     Recent Labs     01/12/25  0404 11/22/23  1215 12/08/22  0855 10/19/22  0920 02/23/22  0756   CHOL 174 218* 199  199 188 217*   LDLF  --   --  91 84 117*   LDLCALC 74 99  --   --   --    HDL 78.4 98.7 84.2 79.7 78.0   TRIG 107 100 121  121 124 111        Imagine Results  Transthoracic Echo (TTE) Complete         CT angio chest for pulmonary embolism   Final Result   1. No pulmonary embolism.   2. " Small peripheral patchy areas of airspace consolidation in the left   lower lobe with accompanying areas of atelectasis in the lower lobes.   Correlate clinically for possible small areas of pneumonia.   3. Coronary artery calcifications.   4. Small hiatal hernia.   Signed by Curt Carty MD      XR chest 1 view   Final Result   Enlarged heart with borderline prominent central pulmonary   vasculature. Blunting left costophrenic angle.   Signed by Leonid Zafar DO         Last I/O:  No intake/output data recorded.    Past Cardiology Tests (Last 3 Years):  EKG:  ECG 12 Lead 1/12/25      Echo:  Transthoracic Echocardiogram 3/2022  1. The left ventricular systolic function is normal with a 65% estimated ejection fraction.     Cath:  No results found for this or any previous visit from the past 1095 days.    Stress Test:  Nuclear Stress Test 04/28/2022  1. Abnormal stress myocardial perfusion imaging in response to pharmacologic stress concerning for mid to distal anterior wall  perfusion concerning for ischemia. Findings discussed with Francisca Bedoya CNP.  2. Well-maintained left ventricular function.     Cardiac Imaging:  CT ANGIO HEART CORONARY 05/05/2022  1. Widely patent coronary arteries without evidence of obstructive stenosis.  2. The distal LAD was not completely visualized.  3. Previously noted findings the left lower lobe which could not be completely evaluated given the restricted field-of-view.    Inpatient Medications:  Scheduled medications   Medication Dose Route Frequency    aspirin  81 mg oral Daily    cholecalciferol  2,000 Units oral Daily    ferrous sulfate (325 mg ferrous sulfate)  65 mg of iron oral q AM    losartan  100 mg oral Daily    pantoprazole  40 mg oral Daily before breakfast    Or    pantoprazole  40 mg intravenous Daily before breakfast    perflutren lipid microspheres  0.5-10 mL of dilution intravenous Once in imaging    perflutren protein A microsphere  0.5 mL intravenous Once  in imaging    [Held by provider] rivaroxaban  20 mg oral Daily    sulfur hexafluoride microsphr  2 mL intravenous Once in imaging     PRN medications   Medication    acetaminophen    Or    acetaminophen    Or    acetaminophen    albuterol    ondansetron    Or    ondansetron     Continuous Medications   Medication Dose Last Rate       Physical Exam:  GENERAL: alert, cooperative, pleasant, in no acute distress  SKIN: warm, dry  NECK: no JVD  CARDIAC: Regular rate and rhythm no murmurs  PULMONARY: Normal respiratory efforts, lungs clear to auscultation bilaterally.  ABDOMEN: soft, nondistended  EXTREMITIES: no lower extremity edema  NEURO: Alert and oriented x 3.  Grossly normal.  Moves all 4 extremities.      Assessment/Plan   Astrid Travis is a 69 y.o. female, with a PMH of HTN, pAF on Xarelto, hypercoagulable state d/t MTHFR mutation w/ hx of DVT/PE, pericardial effusion s/p pericardial window 2001, depression, and OA, who presented to [facility] on 1/12/2025 for chest pain and ANTHONY. Pain is described as a heavy pressure, like someone sitting on her chest, that get worse with mild exertion and did not go away with rest. ED course notable for stable VS, unremarkable labs including negative troponin, no EKG changes. CXR shows enlarged heart with borderline prominent central pulmonary vasculature. Cardiology is consulted for chest pain. CP is now improved s/p Morphine and Dilaudid in the ED.     Home CV Medications: Xarelto 20mg daily in the AM, Losartan-HCTZ 100-25mg daily     #Stable Angina- Chest pain concerning for stable angina and patient w/ multiple risk factors [HTN, clotting disorder, obesity]  -CTA chest negative for PE  #HTN- Soft on presentation- currently acceptable   #HLD- Not currently on statin therapy  pAF- Currently NS, not on BB d/t hx of bradycardia, on Xarelto for AC  #Clotting Disorder- c/w Xarelto        RECOMMENDATIONS:  -Cath postponed until tomorrow.   -NPO at 0000  -Continue to  hold Xarelto  -We will obtain a transthoracic echocardiogram for structural evaluation, measurement of ejection fraction, assessment of regional wall motion abnormalities or valvular disease, and further evaluation of hemodynamics   -Hold HCTZ given soft BP and in anticipation of dye load tomorrow  -Continue Losartan with parameters  -Continuous telemetry monitoring while admitted  -Monitor electrolytes, replete for K <4 and Mg <2      Code Status:  Full Code      Ayse Mijares, APRN-CNP

## 2025-01-14 VITALS
OXYGEN SATURATION: 93 % | RESPIRATION RATE: 15 BRPM | HEIGHT: 64 IN | WEIGHT: 235 LBS | BODY MASS INDEX: 40.12 KG/M2 | DIASTOLIC BLOOD PRESSURE: 85 MMHG | SYSTOLIC BLOOD PRESSURE: 123 MMHG | TEMPERATURE: 96.4 F | HEART RATE: 68 BPM

## 2025-01-14 LAB
ANION GAP SERPL CALC-SCNC: 10 MMOL/L (ref 10–20)
BUN SERPL-MCNC: 15 MG/DL (ref 6–23)
CALCIUM SERPL-MCNC: 8.9 MG/DL (ref 8.6–10.3)
CHLORIDE SERPL-SCNC: 107 MMOL/L (ref 98–107)
CO2 SERPL-SCNC: 30 MMOL/L (ref 21–32)
CREAT SERPL-MCNC: 0.79 MG/DL (ref 0.5–1.05)
EGFRCR SERPLBLD CKD-EPI 2021: 82 ML/MIN/1.73M*2
ERYTHROCYTE [DISTWIDTH] IN BLOOD BY AUTOMATED COUNT: 11.9 % (ref 11.5–14.5)
GLUCOSE SERPL-MCNC: 85 MG/DL (ref 74–99)
HCT VFR BLD AUTO: 36.4 % (ref 36–46)
HGB BLD-MCNC: 12.3 G/DL (ref 12–16)
MAGNESIUM SERPL-MCNC: 1.95 MG/DL (ref 1.6–2.4)
MCH RBC QN AUTO: 31.6 PG (ref 26–34)
MCHC RBC AUTO-ENTMCNC: 33.8 G/DL (ref 32–36)
MCV RBC AUTO: 94 FL (ref 80–100)
NRBC BLD-RTO: 0 /100 WBCS (ref 0–0)
PLATELET # BLD AUTO: 360 X10*3/UL (ref 150–450)
POTASSIUM SERPL-SCNC: 4.8 MMOL/L (ref 3.5–5.3)
RBC # BLD AUTO: 3.89 X10*6/UL (ref 4–5.2)
SODIUM SERPL-SCNC: 142 MMOL/L (ref 136–145)
WBC # BLD AUTO: 6 X10*3/UL (ref 4.4–11.3)

## 2025-01-14 PROCEDURE — 96373 THER/PROPH/DIAG INJ IA: CPT | Performed by: INTERNAL MEDICINE

## 2025-01-14 PROCEDURE — 7100000009 HC PHASE TWO TIME - INITIAL BASE CHARGE: Performed by: INTERNAL MEDICINE

## 2025-01-14 PROCEDURE — 99153 MOD SED SAME PHYS/QHP EA: CPT | Performed by: INTERNAL MEDICINE

## 2025-01-14 PROCEDURE — 2550000001 HC RX 255 CONTRASTS: Performed by: INTERNAL MEDICINE

## 2025-01-14 PROCEDURE — C1769 GUIDE WIRE: HCPCS | Performed by: INTERNAL MEDICINE

## 2025-01-14 PROCEDURE — C1894 INTRO/SHEATH, NON-LASER: HCPCS | Performed by: INTERNAL MEDICINE

## 2025-01-14 PROCEDURE — 2500000001 HC RX 250 WO HCPCS SELF ADMINISTERED DRUGS (ALT 637 FOR MEDICARE OP): Performed by: NURSE PRACTITIONER

## 2025-01-14 PROCEDURE — 2500000001 HC RX 250 WO HCPCS SELF ADMINISTERED DRUGS (ALT 637 FOR MEDICARE OP): Performed by: INTERNAL MEDICINE

## 2025-01-14 PROCEDURE — C1760 CLOSURE DEV, VASC: HCPCS | Performed by: INTERNAL MEDICINE

## 2025-01-14 PROCEDURE — 2500000004 HC RX 250 GENERAL PHARMACY W/ HCPCS (ALT 636 FOR OP/ED): Performed by: INTERNAL MEDICINE

## 2025-01-14 PROCEDURE — 36415 COLL VENOUS BLD VENIPUNCTURE: CPT

## 2025-01-14 PROCEDURE — 99152 MOD SED SAME PHYS/QHP 5/>YRS: CPT | Performed by: INTERNAL MEDICINE

## 2025-01-14 PROCEDURE — 93458 L HRT ARTERY/VENTRICLE ANGIO: CPT | Performed by: INTERNAL MEDICINE

## 2025-01-14 PROCEDURE — 2720000007 HC OR 272 NO HCPCS: Performed by: INTERNAL MEDICINE

## 2025-01-14 PROCEDURE — 85027 COMPLETE CBC AUTOMATED: CPT

## 2025-01-14 PROCEDURE — 2500000001 HC RX 250 WO HCPCS SELF ADMINISTERED DRUGS (ALT 637 FOR MEDICARE OP)

## 2025-01-14 PROCEDURE — 99232 SBSQ HOSP IP/OBS MODERATE 35: CPT

## 2025-01-14 PROCEDURE — 99239 HOSP IP/OBS DSCHRG MGMT >30: CPT

## 2025-01-14 PROCEDURE — G0378 HOSPITAL OBSERVATION PER HR: HCPCS

## 2025-01-14 PROCEDURE — 83735 ASSAY OF MAGNESIUM: CPT

## 2025-01-14 PROCEDURE — 7100000010 HC PHASE TWO TIME - EACH INCREMENTAL 1 MINUTE: Performed by: INTERNAL MEDICINE

## 2025-01-14 PROCEDURE — 80048 BASIC METABOLIC PNL TOTAL CA: CPT

## 2025-01-14 PROCEDURE — 2500000005 HC RX 250 GENERAL PHARMACY W/O HCPCS: Performed by: INTERNAL MEDICINE

## 2025-01-14 RX ORDER — METOPROLOL SUCCINATE 25 MG/1
25 TABLET, EXTENDED RELEASE ORAL DAILY
Qty: 30 TABLET | Refills: 0 | Status: SHIPPED | OUTPATIENT
Start: 2025-01-14 | End: 2025-01-14

## 2025-01-14 RX ORDER — METOPROLOL SUCCINATE 25 MG/1
25 TABLET, EXTENDED RELEASE ORAL DAILY
Status: DISCONTINUED | OUTPATIENT
Start: 2025-01-14 | End: 2025-01-14 | Stop reason: HOSPADM

## 2025-01-14 RX ORDER — FENTANYL CITRATE 50 UG/ML
INJECTION, SOLUTION INTRAMUSCULAR; INTRAVENOUS AS NEEDED
Status: DISCONTINUED | OUTPATIENT
Start: 2025-01-14 | End: 2025-01-14 | Stop reason: HOSPADM

## 2025-01-14 RX ORDER — NITROGLYCERIN 40 MG/100ML
INJECTION INTRAVENOUS AS NEEDED
Status: DISCONTINUED | OUTPATIENT
Start: 2025-01-14 | End: 2025-01-14 | Stop reason: HOSPADM

## 2025-01-14 RX ORDER — VERAPAMIL HYDROCHLORIDE 2.5 MG/ML
INJECTION, SOLUTION INTRAVENOUS AS NEEDED
Status: DISCONTINUED | OUTPATIENT
Start: 2025-01-14 | End: 2025-01-14 | Stop reason: HOSPADM

## 2025-01-14 RX ORDER — ROSUVASTATIN CALCIUM 10 MG/1
10 TABLET, COATED ORAL NIGHTLY
Qty: 30 TABLET | Refills: 0 | Status: SHIPPED | OUTPATIENT
Start: 2025-01-14 | End: 2025-02-13

## 2025-01-14 RX ORDER — LOSARTAN POTASSIUM 100 MG/1
100 TABLET ORAL DAILY
Qty: 30 TABLET | Refills: 0 | Status: SHIPPED | OUTPATIENT
Start: 2025-01-14 | End: 2025-01-14

## 2025-01-14 RX ORDER — MIDAZOLAM HYDROCHLORIDE 1 MG/ML
INJECTION, SOLUTION INTRAMUSCULAR; INTRAVENOUS AS NEEDED
Status: DISCONTINUED | OUTPATIENT
Start: 2025-01-14 | End: 2025-01-14 | Stop reason: HOSPADM

## 2025-01-14 RX ORDER — ROSUVASTATIN CALCIUM 10 MG/1
10 TABLET, COATED ORAL NIGHTLY
Qty: 30 TABLET | Refills: 0 | Status: SHIPPED | OUTPATIENT
Start: 2025-01-14 | End: 2025-01-14

## 2025-01-14 RX ORDER — LOSARTAN POTASSIUM 100 MG/1
100 TABLET ORAL DAILY
Qty: 30 TABLET | Refills: 0 | Status: SHIPPED | OUTPATIENT
Start: 2025-01-14 | End: 2025-02-13

## 2025-01-14 RX ORDER — METOPROLOL SUCCINATE 25 MG/1
25 TABLET, EXTENDED RELEASE ORAL DAILY
Qty: 30 TABLET | Refills: 0 | Status: SHIPPED | OUTPATIENT
Start: 2025-01-14 | End: 2025-02-13

## 2025-01-14 RX ORDER — ROSUVASTATIN CALCIUM 10 MG/1
10 TABLET, COATED ORAL NIGHTLY
Status: DISCONTINUED | OUTPATIENT
Start: 2025-01-14 | End: 2025-01-14 | Stop reason: HOSPADM

## 2025-01-14 RX ORDER — LIDOCAINE HYDROCHLORIDE 10 MG/ML
INJECTION, SOLUTION EPIDURAL; INFILTRATION; INTRACAUDAL; PERINEURAL AS NEEDED
Status: DISCONTINUED | OUTPATIENT
Start: 2025-01-14 | End: 2025-01-14 | Stop reason: HOSPADM

## 2025-01-14 RX ADMIN — LOSARTAN POTASSIUM 100 MG: 50 TABLET, FILM COATED ORAL at 15:03

## 2025-01-14 RX ADMIN — Medication 2000 UNITS: at 15:01

## 2025-01-14 RX ADMIN — FERROUS SULFATE TAB 325 MG (65 MG ELEMENTAL FE) 325 MG: 325 (65 FE) TAB at 15:03

## 2025-01-14 RX ADMIN — METOPROLOL SUCCINATE 25 MG: 25 TABLET, EXTENDED RELEASE ORAL at 15:03

## 2025-01-14 RX ADMIN — PANTOPRAZOLE SODIUM 40 MG: 40 TABLET, DELAYED RELEASE ORAL at 15:03

## 2025-01-14 RX ADMIN — ASPIRIN 81 MG 81 MG: 81 TABLET ORAL at 08:03

## 2025-01-14 ASSESSMENT — PAIN SCALES - GENERAL

## 2025-01-14 ASSESSMENT — COLUMBIA-SUICIDE SEVERITY RATING SCALE - C-SSRS
1. IN THE PAST MONTH, HAVE YOU WISHED YOU WERE DEAD OR WISHED YOU COULD GO TO SLEEP AND NOT WAKE UP?: NO
6. HAVE YOU EVER DONE ANYTHING, STARTED TO DO ANYTHING, OR PREPARED TO DO ANYTHING TO END YOUR LIFE?: NO
2. HAVE YOU ACTUALLY HAD ANY THOUGHTS OF KILLING YOURSELF?: NO

## 2025-01-14 NOTE — POST-PROCEDURE NOTE
Physician Transition of Care Summary  Invasive Cardiovascular Lab    Procedure Date: 1/14/2025  Attending:    * Carl B Gillombardo - Primary  Resident/Fellow/Other Assistant: Surgeons and Role:  * No surgeons found with a matching role *    Indications:   Pre-op Diagnosis      * Stable angina (CMS-HCC) [I20.89]    Post-procedure diagnosis:   Post-op Diagnosis     * Stable angina (CMS-HCC) [I20.89]    Procedure(s):   Left Heart Cath with Coronary Angiography and LV  07939 - MO CATH PLMT L HRT & ARTS W/NJX & ANGIO IMG S&I        Procedure Findings:   Right dominant coronary circulation with mild nonobstructive CAD  LVEDP 17 mmHg    Description of the Procedure:   6 [place]in right radial artery access, hemostasis achieved using a single TR band.  The right radial artery is diminutive, would only tolerate 4 [place]in diagnostic catheters    Complications:   None    Stents/Implants:   Implants       No implant documentation for this case.            Anticoagulation/Antiplatelet Plan:   Periprocedural heparin, aspirin    Estimated Blood Loss:   10 mL    Anesthesia: Moderate Sedation Anesthesia Staff: No anesthesia staff entered.    Any Specimen(s) Removed:   No specimens collected during this procedure.    Disposition:   Routine post-cath care      Electronically signed by: Carl B Gillombardo, MD, 1/14/2025 9:38 AM

## 2025-01-14 NOTE — PROGRESS NOTES
01/14/25 0735   Discharge Planning   Expected Discharge Disposition Home     Admitted with chest pain. Cardiology following- plan for [facility] today. Current dc plan home when medically stable.

## 2025-01-14 NOTE — INTERVAL H&P NOTE
H&P reviewed. The patient was examined and there are no changes to the H&P.    ASA Classification: III  Mallampati Score: Class III      Here for LHC in the setting of cp with Dr. Gillombardo 1/14/25. Last dose of Xarelto 1/12/25 AM.       Further mgmt per inpatient primary and consulting teams, Cardiology following.    Wendy Parra APRCARROLL-CNP  Interventional Lab/Cardiology   [facility]

## 2025-01-14 NOTE — DISCHARGE SUMMARY
Discharge Diagnosis  Chest pain    Issues Requiring Follow-Up  Follow up with PCP and Cardiology following hospital visit for chest pain     Discharge Meds     Medication List      START taking these medications     losartan 100 mg tablet; Commonly known as: Cozaar; Take 1 tablet (100   mg) by mouth once daily.   metoprolol succinate XL 25 mg 24 hr tablet; Commonly known as:   Toprol-XL; Take 1 tablet (25 mg) by mouth once daily. Do not crush or   chew.   rosuvastatin 10 mg tablet; Commonly known as: Crestor; Take 1 tablet (10   mg) by mouth once daily at bedtime.     CONTINUE taking these medications     albuterol 90 mcg/actuation inhaler; Inhale 2 puffs every 6 hours if   needed for shortness of breath.   ferrous sulfate 325 (65 Fe) MG EC tablet   Vitamin D3 50 MCG (2000 UT) tablet; Generic drug: cholecalciferol   Xarelto 20 mg tablet; Generic drug: rivaroxaban; TAKE 1 TABLET BY MOUTH   ONCE DAILY     STOP taking these medications     losartan-hydrochlorothiazide 100-25 mg tablet; Commonly known as: Hyzaar       Test Results Pending At Discharge  Pending Labs       No current pending labs.            Hospital Course  Astrid Travis is a 69 y.o. female with PMH of MDD, HTN, CVD, A-fib on Xarelto, hypercoagulable state d/t MTHFR mutation w/ hx of DVT/PE, pericardial effusion s/p pericardial window 2001, and CAD presenting to the hospital for chest pain. She reports walking around [facility] yesterday when the chest pain started. She states the chest pain is in the center of her chest and feels like she has a diana stabbing her from front to back. Initially, the chest pain went away with rest, but returned a short time later even at rest. She reports associated SOB. She states every time she tries to take a deep breath the chest pain is worse. Of note she recently was diagnosed with COVID on the Dec. 29th with associated cough. She was taking albuterol for the cough up until Friday. She states she did have rib pain from  the coughing, but no chest pain like she has now. Denies fever, chills, papulations, diaphoresis, dizziness, jaw pain, abdominal pain, nausea, or vomiting. She has not missed any doses of her Xarelto.     Upon chart review in 2022 patient had an abnormal stress test with a CTA to follow up. The distal LAD was not completely visualized on CTA which was the area of concern on the stress test; however, all other coronary arteries were widely patent and patient was asymptomatic. Patient was instructed to start a high intensity statin and follow up with cardiology in 3 months. No further documentation implies patient started the stain. Patient only vaguely remembers these two tests.     Observation Course: Pt was admitted to observation for continued monitoring and cardiology consult. She was seen by the Cardiology team who ultimately decided on LHC which was done on 1/14/25.  LHC - Right dominant coronary circulation with mild nonobstructive CAD. LVEDP 17mmHg. She was cleared for discharge by Cardiology.     Discharge weight: 107 kg    Discharge plan: Stop Hyzaar. Start Losartan 100mg. Start Toprol XL 25mg daily. Start rosuvastatin 10mg daily. Follow up with Cardiology and PCP outpatient. Take BP at home. Low sodium diet. Increase intake of vegetables, fruits, legumes, nuts, whole grains, and fish to reduce cardiovascular risks. Replace saturated fats with healthier fats like monounsaturated and polyunsaturated fats. Reduce cholesterol and sodium intake. Minimize consumption of processed meats, refined carbohydrates, and sweetened beverages. Weight loss is recommended to improve overall cardiovascular health. Pt is in agreement with plan.     Labs and vitals stable for discharge. Discharged in stable and satisfactory condition.     Discussed with Dr. Kuhn and the interdisciplinary team           Pertinent Physical Exam At Time of Discharge  Physical Exam  Constitutional:       General: She is not in acute distress.      Appearance: Normal appearance. She is not ill-appearing or toxic-appearing.   HENT:      Head: Normocephalic and atraumatic.   Cardiovascular:      Rate and Rhythm: Normal rate and regular rhythm.      Pulses: Normal pulses.      Heart sounds: Normal heart sounds. No murmur heard.     No friction rub. No gallop.   Pulmonary:      Effort: Pulmonary effort is normal.      Breath sounds: Normal breath sounds. No wheezing, rhonchi or rales.   Abdominal:      General: There is no distension.      Palpations: Abdomen is soft. There is no mass.      Tenderness: There is no abdominal tenderness.   Musculoskeletal:      Right lower leg: No edema.      Left lower leg: No edema.   Skin:     General: Skin is warm and dry.   Neurological:      General: No focal deficit present.      Mental Status: She is alert and oriented to person, place, and time.   Psychiatric:         Mood and Affect: Mood normal.         Behavior: Behavior normal.         Outpatient Follow-Up  Future Appointments   Date Time Provider Department Center   6/11/2025 10:00 AM CARON Aleman-KARTHIK [id number]         Soila Fuller PA-C

## 2025-01-14 NOTE — PROGRESS NOTES
Subjective Data:  No chest pain, no chest pressure, no shortness of breath  Went to the Cath Lab    Echocardiogram showed   1. Left ventricular ejection fraction is normal, calculated by Pathak's biplane at 72%.   2. There is normal right ventricular global systolic function.   3. Right ventricular systolic pressure is within normal limits.   4. Mild aortic valve regurgitation.     C Right dominant coronary circulation with mild nonobstructive CAD. LVEDP 17 mmHg     Overnight Events:    None     Objective Data:  Last Recorded Vitals:  Vitals:    01/14/25 1010 01/14/25 1025 01/14/25 1030 01/14/25 1040   BP:   111/54    BP Location:   Left arm    Patient Position:   Lying    Pulse:   80    Resp:   14    Temp:       TempSrc:       SpO2: 98% 96% 96% 95%   Weight:       Height:           Last Labs:  CBC - 1/14/2025:  4:38 AM  6.0 12.3 360    36.4      CMP - 1/14/2025:  4:38 AM  8.9 6.3 16 --- 0.6   _ 4.0 15 68      PTT - 10/15/2024: 10:27 AM  1.0   11.5 39     TROPHS   Date/Time Value Ref Range Status   01/13/2025 05:00 AM 4 0 - 13 ng/L Final   01/12/2025 04:04 AM <3 0 - 13 ng/L Final   01/12/2025 02:50 AM 3 0 - 13 ng/L Final     BNP   Date/Time Value Ref Range Status   01/12/2025 02:50 AM 78 0 - 99 pg/mL Final   10/19/2022 09:20 AM 71 0 - 99 pg/mL Final     Comment:     .  <100 pg/mL - Heart failure unlikely  100-299 pg/mL - Intermediate probability of acute heart  .               failure exacerbation. Correlate with clinical  .               context and patient history.    >=300 pg/mL - Heart Failure likely. Correlate with clinical  .               context and patient history.   Biotin interference may cause falsely decreased results.   Patients taking a Biotin dose of up to 5 mg/day should   refrain from taking Biotin for 24 hours before sample   collection. Providers may contact their local laboratory   for further information.     02/23/2022 07:56 AM 29 0 - 99 pg/mL Final     Comment:     .  <100 pg/mL - Heart  failure unlikely  100-299 pg/mL - Intermediate probability of acute heart  .               failure exacerbation. Correlate with clinical  .               context and patient history.    >=300 pg/mL - Heart Failure likely. Correlate with clinical  .               context and patient history.   Biotin interference may cause falsely decreased results.   Patients taking a Biotin dose of up to 5 mg/day should   refrain from taking Biotin for 24 hours before sample   collection. Providers may contact their local laboratory   for further information.       HGBA1C   Date/Time Value Ref Range Status   01/12/2025 02:50 AM 5.5 See comment % Final   10/15/2024 10:27 AM 5.4 See comment % Final     LDLCALC   Date/Time Value Ref Range Status   01/12/2025 04:04 AM 74 <=99 mg/dL Final     Comment:                                 Near   Borderline      AGE      Desirable  Optimal    High     High     Very High     0-19 Y     0 - 109     ---    110-129   >/= 130     ----    20-24 Y     0 - 119     ---    120-159   >/= 160     ----      >24 Y     0 -  99   100-129  130-159   160-189     >/=190     11/22/2023 12:15 PM 99 <=99 mg/dL Final     Comment:                                 Near   Borderline      AGE      Desirable  Optimal    High     High     Very High     0-19 Y     0 - 109     ---    110-129   >/= 130     ----    20-24 Y     0 - 119     ---    120-159   >/= 160     ----      >24 Y     0 -  99   100-129  130-159   160-189     >/=190       VLDL   Date/Time Value Ref Range Status   01/12/2025 04:04 AM 21 0 - 40 mg/dL Final   11/22/2023 12:15 PM 20 0 - 40 mg/dL Final   12/08/2022 08:55 AM 24 0 - 40 mg/dL Final   10/19/2022 09:20 AM 25 0 - 40 mg/dL Final   02/23/2022 07:56 AM 22 0 - 40 mg/dL Final      Last I/O:  I/O last 3 completed shifts:  In: 100 (1 mL/kg) [I.V.:100 (1 mL/kg)]  Out: - (0 mL/kg)   Weight: 99.8 kg     Past Cardiology Tests (Last 3 Years):  EKG:  ECG 12 lead 01/12/2025 (Preliminary)    Echo:  Transthoracic Echo  (TTE) Complete 01/13/2025    Ejection Fractions:  EF   Date/Time Value Ref Range Status   01/13/2025 11:06 AM 72 %      Cath:  No results found for this or any previous visit from the past 1095 days.    Stress Test:  Nuclear Stress Test 04/28/2022    Cardiac Imaging:  CT ANGIO HEART CORONARY 05/05/2022      Inpatient Medications:  Scheduled medications   Medication Dose Route Frequency    aspirin  81 mg oral Daily    cholecalciferol  2,000 Units oral Daily    ferrous sulfate (325 mg ferrous sulfate)  65 mg of iron oral q AM    losartan  100 mg oral Daily    oxygen   inhalation Continuous - 02/gases    pantoprazole  40 mg oral Daily before breakfast    Or    pantoprazole  40 mg intravenous Daily before breakfast    perflutren lipid microspheres  0.5-10 mL of dilution intravenous Once in imaging    perflutren protein A microsphere  0.5 mL intravenous Once in imaging    [Held by provider] rivaroxaban  20 mg oral Daily    sulfur hexafluoride microsphr  2 mL intravenous Once in imaging     PRN medications   Medication    acetaminophen    Or    acetaminophen    Or    acetaminophen    albuterol    ondansetron    Or    ondansetron     Continuous Medications   Medication Dose Last Rate     Physical Exam:  GENERAL: alert, cooperative, pleasant, in no acute distress  SKIN: warm, dry  NECK: no JVD  CARDIAC: Regular rate and rhythm no murmurs  PULMONARY: Normal respiratory efforts, lungs clear to auscultation bilaterally.  ABDOMEN: soft, nondistended  EXTREMITIES: no lower extremity edema  NEURO: Alert and oriented x 3.  Grossly normal.  Moves all 4 extremities.      Assessment/Plan  Astrid Travis is a 69 y.o. female, with a PMH of HTN, pAF on Xarelto, hypercoagulable state d/t MTHFR mutation w/ hx of DVT/PE, pericardial effusion s/p pericardial window 2001, depression, and OA, who presented to [facility] on 1/12/2025 for chest pain and ANTHONY. Pain is described as a heavy pressure, like someone sitting on her chest,  that get worse with mild exertion and did not go away with rest. ED course notable for stable VS, unremarkable labs including negative troponin, no EKG changes. CXR shows enlarged heart with borderline prominent central pulmonary vasculature. Cardiology is consulted for chest pain. CP is now improved s/p Morphine and Dilaudid in the ED.     Home CV Medications: Xarelto 20mg daily in the AM, Losartan-HCTZ 100-25mg daily    Echocardiogram    1. Left ventricular ejection fraction is normal, calculated by Pathak's biplane at 72%.   2. There is normal right ventricular global systolic function.   3. Right ventricular systolic pressure is within normal limits.   4. Mild aortic valve regurgitation.     LHC Right dominant coronary circulation with mild nonobstructive CAD. LVEDP 17 mmHg     #Chest pain  -LHC Right dominant coronary circulation with mild nonobstructive CAD. LVEDP 17 mmHg  #HTN- Soft on presentation- currently acceptable   #HLD- Not currently on statin therapy  pAF- Currently NS, not on BB d/t hx of bradycardia, on Xarelto for AC  #Clotting Disorder- c/w Xarelto        RECOMMENDATIONS:  -Can re start Xarelto  -Continue Losartan 100 mg   -Add low dose betablocker - Toprol XL 25 mg once a day  -Add statin - Rosuvastatin 10 mg   -Cardiology outpatient follow up  -Check BP at home  -Low sodium diet  -Dietary Recommendations: Increase intake of vegetables, fruits, legumes, nuts, whole grains, and fish to reduce cardiovascular risks. Replace saturated fats with healthier fats like monounsaturated and polyunsaturated fats. Reduce cholesterol and sodium intake. Minimize consumption of processed meats, refined carbohydrates, and sweetened beverages.  -Weight Management: Weight loss is recommended to improve overall cardiovascular health.  -Increase physical activity progressively as tolerated  -Eventual cardiac rehabilitation as an outpatient if continues with chest pain  -No barriers to discharge from a cardiological  standpoint.   -Cardiology will sing off, please call if questions      Code Status:  Full Code    Huang Campo MD

## 2025-01-14 NOTE — DISCHARGE INSTRUCTIONS

## 2025-01-16 ENCOUNTER — PATIENT OUTREACH (OUTPATIENT)
Dept: PRIMARY CARE | Facility: CLINIC | Age: 70
End: 2025-01-16
Payer: MEDICARE

## 2025-01-16 DIAGNOSIS — I20.0 UNSTABLE ANGINA PECTORIS (MULTI): ICD-10-CM

## 2025-01-16 DIAGNOSIS — Z09 HOSPITAL DISCHARGE FOLLOW-UP: ICD-10-CM

## 2025-01-16 NOTE — NURSING NOTE
Received from cath lab via bed. Denies cp or sob. Rt. Radial opsite intact with 2+ radial pulse and no bldding. Tele shows nsr 70's. Reminded of need for limited use of rt. Arm/wrist. Resting in bed.

## 2025-01-16 NOTE — PROGRESS NOTES
TCM completed 01/16/25   Discharge Facility:  [facility]  Discharge Diagnosis: Chest pain   Admission Date: 1/12/25  Discharge Date: 1/14/25    PCP Appointment Date: 1/20/25  Specialist Appointment Date: Cardiology- D  Hospital Encounter and Summary Linked: Yes                    --See discharge assessment below for further details--      Engagement  Call Start Time: 1538 (1/16/2025  3:43 PM)    Medications  Medications reviewed with patient/caregiver?: Yes (1/16/2025  3:43 PM)  Is the patient having any side effects they believe may be caused by any medication additions or changes?: No (1/16/2025  3:43 PM)  Does the patient have all medications ordered at discharge?: Yes (1/16/2025  3:43 PM)  Care Management Interventions: No intervention needed (1/16/2025  3:43 PM)  Prescription Comments: START taking these medications:    Losartan 100 mg tablet; Commonly known as: Cozaar; Take 1 tablet (100   mg) by mouth once daily.   Metoprolol succinate XL 25 mg 24 hr tablet; Commonly known as:   Toprol-XL; Take 1 tablet (25 mg) by mouth once daily. Do not crush or   chew.   Rosuvastatin 10 mg tablet; Commonly known as: Crestor; Take 1 tablet (10 mg) by mouth once daily at bedtime.      STOP taking these medications:     Losartan-hydrochlorothiazide 100-25 mg tablet; Commonly known as: Hyzaar (1/16/2025  3:43 PM)  Is the patient taking all medications as directed (includes completed medication regime)?: Yes (1/16/2025  3:43 PM)  Care Management Interventions: Provided patient education (1/16/2025  3:43 PM)  Medication Comments: Sent to [facility] pharmacy and received after discharge. (1/16/2025  3:43 PM)    Appointments  Does the patient have a primary care provider?: Yes (1/16/2025  3:43 PM)  Care Management Interventions: Verified appointment date/time/provider (1/16/2025  3:43 PM)  Has the patient kept scheduled appointments due by today?: Not applicable (1/16/2025  3:43 PM)  Care Management Interventions: Educated on  importance of keeping appointment; Advised patient to keep appointment; Advised to schedule with specialist (1/16/2025  3:43 PM)    Self Management  What is the home health agency?: n/a (1/16/2025  3:43 PM)  Has home health visited the patient within 72 hours of discharge?: Not applicable (1/16/2025  3:43 PM)  What Durable Medical Equipment (DME) was ordered?: n/a (1/16/2025  3:43 PM)    Patient Teaching  Does the patient have access to their discharge instructions?: Yes (1/16/2025  3:43 PM)  Care Management Interventions: Reviewed instructions with patient (1/16/2025  3:43 PM)  What is the patient's perception of their health status since discharge?: Improving (1/16/2025  3:43 PM)  Is the patient/caregiver able to teach back the hierarchy of who to call/visit for symptoms/problems? PCP, Specialist, Home Health nurse, Urgent Care, ED, 911: Yes (1/16/2025  3:43 PM)  Patient/Caregiver Education Comments: Spoke with the patient who reports doing well at home since discharge. Patient denied any acute changes/concerns in her condition since leaving the hospital. Patient denied needing any assistance in the home. New meds reviewed. Pt denies any prescription medication questions. Patient denies any discharge questions/needs at this time. (1/16/2025  3:43 PM)    Wrap Up  Wrap Up Additional Comments: TCM initial outreach post discharge completed successfully. Patient confirmed she received her discharge summary and has all medications needed in the home. Patient denied need for DME, HHC or assistance obtaining transportation. TCM phone number was provided to the patient, with the patient encouraged to call back with any non-emergent questions or concerns. Patient verbalized her understanding and states she has no questions or concerns at this time, but will call back if needed. All follow up appts have been scheduled with the patient encouraged to keep all follow up appts to prevent re-hospitalization. (1/16/2025  3:43  PM)  Call End Time: 1539 (1/16/2025  3:43 PM)

## 2025-01-17 ENCOUNTER — APPOINTMENT (OUTPATIENT)
Dept: OPHTHALMOLOGY | Facility: CLINIC | Age: 70
End: 2025-01-17
Payer: COMMERCIAL

## 2025-01-20 ENCOUNTER — APPOINTMENT (OUTPATIENT)
Dept: PRIMARY CARE | Facility: CLINIC | Age: 70
End: 2025-01-20
Payer: MEDICARE

## 2025-01-20 VITALS
SYSTOLIC BLOOD PRESSURE: 116 MMHG | DIASTOLIC BLOOD PRESSURE: 72 MMHG | OXYGEN SATURATION: 96 % | HEIGHT: 64 IN | WEIGHT: 237.8 LBS | TEMPERATURE: 97.5 F | HEART RATE: 78 BPM | BODY MASS INDEX: 40.6 KG/M2

## 2025-01-20 DIAGNOSIS — J45.40 MODERATE PERSISTENT ASTHMA WITHOUT COMPLICATION (HHS-HCC): ICD-10-CM

## 2025-01-20 DIAGNOSIS — I10 ESSENTIAL HYPERTENSION: ICD-10-CM

## 2025-01-20 DIAGNOSIS — R05.1 ACUTE COUGH: ICD-10-CM

## 2025-01-20 DIAGNOSIS — F33.1 MAJOR DEPRESSIVE DISORDER, RECURRENT EPISODE, MODERATE: ICD-10-CM

## 2025-01-20 DIAGNOSIS — R07.9 CHEST PAIN, UNSPECIFIED TYPE: Primary | ICD-10-CM

## 2025-01-20 PROCEDURE — 3078F DIAST BP <80 MM HG: CPT | Performed by: NURSE PRACTITIONER

## 2025-01-20 PROCEDURE — 99496 TRANSJ CARE MGMT HIGH F2F 7D: CPT | Performed by: NURSE PRACTITIONER

## 2025-01-20 PROCEDURE — 3074F SYST BP LT 130 MM HG: CPT | Performed by: NURSE PRACTITIONER

## 2025-01-20 PROCEDURE — 1159F MED LIST DOCD IN RCRD: CPT | Performed by: NURSE PRACTITIONER

## 2025-01-20 PROCEDURE — 3008F BODY MASS INDEX DOCD: CPT | Performed by: NURSE PRACTITIONER

## 2025-01-20 PROCEDURE — 1126F AMNT PAIN NOTED NONE PRSNT: CPT | Performed by: NURSE PRACTITIONER

## 2025-01-20 PROCEDURE — 1157F ADVNC CARE PLAN IN RCRD: CPT | Performed by: NURSE PRACTITIONER

## 2025-01-20 PROCEDURE — 1160F RVW MEDS BY RX/DR IN RCRD: CPT | Performed by: NURSE PRACTITIONER

## 2025-01-20 RX ORDER — ROSUVASTATIN CALCIUM 10 MG/1
10 TABLET, COATED ORAL NIGHTLY
Qty: 90 TABLET | Refills: 1 | Status: SHIPPED | OUTPATIENT
Start: 2025-01-20 | End: 2025-07-19

## 2025-01-20 RX ORDER — ESCITALOPRAM OXALATE 10 MG/1
10 TABLET ORAL DAILY
Qty: 90 TABLET | Refills: 1 | Status: SHIPPED | OUTPATIENT
Start: 2025-01-20 | End: 2025-07-19

## 2025-01-20 RX ORDER — PREDNISONE 10 MG/1
TABLET ORAL
Qty: 30 TABLET | Refills: 0 | Status: SHIPPED | OUTPATIENT
Start: 2025-01-20 | End: 2025-01-30

## 2025-01-20 RX ORDER — LOSARTAN POTASSIUM AND HYDROCHLOROTHIAZIDE 25; 100 MG/1; MG/1
1 TABLET ORAL DAILY
Start: 2025-01-20 | End: 2025-07-19

## 2025-01-20 ASSESSMENT — PAIN SCALES - GENERAL: PAINLEVEL_OUTOF10: 0-NO PAIN

## 2025-01-20 NOTE — PROGRESS NOTES
"Patient: Astrid Travis  : 1955  PCP: Leann Fishman, APRN-CNP  MRN: [id number]  Program: Transitional Care Management  Status: Enrolled  Effective Dates: 2025 - present  Responsible Staff: Amie Anderson  Social Drivers to be Addressed: Physical Activity, Social Connections, Stress      Astrid Travis is a 69 y.o. female presenting today for follow-up after being discharged from the hospital 7 days ago. The main problem requiring admission was for chest pain. The discharge summary and/or Transitional Care Management documentation was reviewed. Medication reconciliation was performed as indicated via the \"Zander as Reviewed\" timestamp.     Astrid Travis was contacted by Transitional Care Management services two days after her discharge. This encounter and supporting documentation was reviewed.    Admitted to observation with cardiology consult.  [facility] 2025: Right dominant coronary circulation with mild nonobstructive CAD. LVEDP 17mmHg.  Medication changes recommended:  Stop Hyzaar --> Losartan 100mg  Started toprol XL 25mg, rosuvastatin 10mg  Cardiology follow up    Has not started metoprolol due to bradycardia with use in the past    Never stopped hyzaar due to need for diuretic.  Does not want to start cholesterol medication. Feels more dietary.  Cardiac testing reviewed with patient.    Remains uncertain as to cause of CP  Occurred s/p covid infection  Hx histplasmosis  Continues to had chest discomfort with trying to deep breath. New right posterior back discomfort.  Continues to have dry cough.   Has been using albuterol inhaler. Helps at times. Last use early this morning.    Admits to increase in anxiety symptoms  Would like to start medication to help with feeling overwhelmed and worried regarding health changes.    Review of Systems    /72 (BP Location: Left arm, Patient Position: Sitting, BP Cuff Size: Large adult)   Pulse 78   Temp 36.4 °C (97.5 °F) (Temporal)   Ht 1.626 m (5' " Call placed to patient, she states, she would like to know if she can donate blood  with her diagnosis of PXE. Patient then said she does have an appointment to see   PCP today and will ask him.  Writer also suggested asking [facility] if they   would accept her as a donor of blood.      Writer informed patient that [facility] [employer] paperwork has been completed and   Faxed in on 3/27.     "4\")   Wt 108 kg (237 lb 12.8 oz)   SpO2 96%   BMI 40.82 kg/m²     Physical Exam  Vitals and nursing note reviewed.   Constitutional:       General: She is not in acute distress.     Appearance: Normal appearance. She is obese.   Cardiovascular:      Rate and Rhythm: Normal rate and regular rhythm.      Pulses: Normal pulses.      Heart sounds: Normal heart sounds. No murmur heard.  Pulmonary:      Effort: Pulmonary effort is normal. No respiratory distress.      Breath sounds: Examination of the right-upper field reveals wheezing. Examination of the left-upper field reveals wheezing. Examination of the right-middle field reveals wheezing. Examination of the left-middle field reveals wheezing. Wheezing present. No rhonchi.      Comments: Mild-moderate with inhalation, mild on exhaling  Musculoskeletal:      Right lower le+ Edema present.      Left lower le+ Edema present.   Skin:     General: Skin is warm.      Capillary Refill: Capillary refill takes less than 2 seconds.   Psychiatric:         Mood and Affect: Mood normal.       The complexity of medical decision making for this patient's transitional care is moderate.    Assessment/Plan   Diagnoses and all orders for this visit:  Chest pain, unspecified type  -     rosuvastatin (Crestor) 10 mg tablet; Take 1 tablet (10 mg) by mouth once daily at bedtime.  -     predniSONE (Deltasone) 10 mg tablet; Take 5 tablets (50 mg) by mouth once daily for 2 days, THEN 4 tablets (40 mg) once daily for 2 days, THEN 3 tablets (30 mg) once daily for 2 days, THEN 2 tablets (20 mg) once daily for 2 days, THEN 1 tablet (10 mg) once daily for 2 days.  Essential hypertension  -     losartan-hydrochlorothiazide (Hyzaar) 100-25 mg tablet; Take 1 tablet by mouth once daily.  Acute cough  Major depressive disorder, recurrent episode, moderate  -     escitalopram (Lexapro) 10 mg tablet; Take 1 tablet (10 mg) by mouth once daily.  Moderate persistent asthma without complication " ([facility])  -     spirometers and accessories device; Use as directed

## 2025-01-20 NOTE — PATIENT INSTRUCTIONS
Chest pain: Improved  Will start prednisone taper for bronchial symptoms, cough  Encouraged to continue to deep breath throughout the day to help open airways    BP controlled  Continue losartan-hydrochlorothiazide daily    Encouraged to start rosuvastatin for currently know CAD, although mild    Start lexapro daily for anxiety  Start with 5mg daily for one week, then increase to 10mg daily  Reevaluate in 6-8 weeks, sooner with any changes in symptoms

## 2025-01-22 DIAGNOSIS — I50.30 DIASTOLIC HEART FAILURE, UNSPECIFIED HF CHRONICITY: ICD-10-CM

## 2025-01-22 DIAGNOSIS — J45.40 MODERATE PERSISTENT ASTHMA WITHOUT COMPLICATION (HHS-HCC): Primary | ICD-10-CM

## 2025-01-26 LAB
ATRIAL RATE: 74 BPM
P AXIS: 62 DEGREES
P OFFSET: 170 MS
P ONSET: 121 MS
PR INTERVAL: 210 MS
Q ONSET: 226 MS
QRS COUNT: 12 BEATS
QRS DURATION: 72 MS
QT INTERVAL: 388 MS
QTC CALCULATION(BAZETT): 430 MS
QTC FREDERICIA: 416 MS
R AXIS: 72 DEGREES
T AXIS: 45 DEGREES
T OFFSET: 420 MS
VENTRICULAR RATE: 74 BPM

## 2025-01-28 DIAGNOSIS — J45.40 MODERATE PERSISTENT ASTHMA WITHOUT COMPLICATION (HHS-HCC): Primary | ICD-10-CM

## 2025-01-28 RX ORDER — PREDNISONE 10 MG/1
TABLET ORAL
Qty: 13 TABLET | Refills: 0 | Status: SHIPPED | OUTPATIENT
Start: 2025-01-28 | End: 2025-02-05

## 2025-02-03 ENCOUNTER — APPOINTMENT (OUTPATIENT)
Dept: CARDIOLOGY | Facility: CLINIC | Age: 70
End: 2025-02-03
Payer: MEDICARE

## 2025-02-04 ENCOUNTER — PATIENT OUTREACH (OUTPATIENT)
Dept: PRIMARY CARE | Facility: CLINIC | Age: 70
End: 2025-02-04
Payer: MEDICARE

## 2025-02-04 DIAGNOSIS — Z09 HOSPITAL DISCHARGE FOLLOW-UP: ICD-10-CM

## 2025-02-05 DIAGNOSIS — R05.9 COUGH, UNSPECIFIED TYPE: ICD-10-CM

## 2025-02-05 RX ORDER — DOXYCYCLINE 100 MG/1
100 CAPSULE ORAL 2 TIMES DAILY
Qty: 20 CAPSULE | Refills: 0 | Status: SHIPPED | OUTPATIENT
Start: 2025-02-05 | End: 2025-02-15

## 2025-02-05 RX ORDER — BENZONATATE 200 MG/1
200 CAPSULE ORAL 3 TIMES DAILY PRN
Qty: 42 CAPSULE | Refills: 0 | Status: SHIPPED | OUTPATIENT
Start: 2025-02-05 | End: 2025-03-07

## 2025-02-21 ENCOUNTER — PATIENT OUTREACH (OUTPATIENT)
Dept: PRIMARY CARE | Facility: CLINIC | Age: 70
End: 2025-02-21
Payer: MEDICARE

## 2025-02-25 ENCOUNTER — APPOINTMENT (OUTPATIENT)
Dept: PRIMARY CARE | Facility: CLINIC | Age: 70
End: 2025-02-25
Payer: MEDICARE

## 2025-02-28 ENCOUNTER — APPOINTMENT (OUTPATIENT)
Dept: OPHTHALMOLOGY | Facility: CLINIC | Age: 70
End: 2025-02-28
Payer: MEDICARE

## 2025-02-28 DIAGNOSIS — Z86.69 HISTORY OF UVEITIS: Primary | ICD-10-CM

## 2025-02-28 DIAGNOSIS — H53.15 VISUAL DISTORTIONS OF SHAPE AND SIZE: ICD-10-CM

## 2025-02-28 DIAGNOSIS — H20.9 IRIDOCYCLITIS, RIGHT EYE: ICD-10-CM

## 2025-02-28 ASSESSMENT — ENCOUNTER SYMPTOMS
ENDOCRINE NEGATIVE: 0
ALLERGIC/IMMUNOLOGIC NEGATIVE: 0
CARDIOVASCULAR NEGATIVE: 1
MUSCULOSKELETAL NEGATIVE: 0
HEMATOLOGIC/LYMPHATIC NEGATIVE: 0
EYES NEGATIVE: 0
NEUROLOGICAL NEGATIVE: 0
CONSTITUTIONAL NEGATIVE: 0
RESPIRATORY NEGATIVE: 1
PSYCHIATRIC NEGATIVE: 0
GASTROINTESTINAL NEGATIVE: 0

## 2025-02-28 ASSESSMENT — VISUAL ACUITY
OS_SC: 20/25
OD_SC: 20/30
METHOD: SNELLEN - LINEAR

## 2025-02-28 ASSESSMENT — TONOMETRY
OS_IOP_MMHG: 14
IOP_METHOD: GOLDMANN APPLANATION
OD_IOP_MMHG: 13

## 2025-02-28 ASSESSMENT — SLIT LAMP EXAM - LIDS
COMMENTS: NORMAL
COMMENTS: NORMAL

## 2025-02-28 ASSESSMENT — CUP TO DISC RATIO
OS_RATIO: 0.3
OD_RATIO: 0.3

## 2025-02-28 ASSESSMENT — EXTERNAL EXAM - RIGHT EYE: OD_EXAM: NORMAL

## 2025-02-28 ASSESSMENT — EXTERNAL EXAM - LEFT EYE: OS_EXAM: NORMAL

## 2025-02-28 NOTE — PROGRESS NOTES
Assessment/Plan   Diagnoses and all orders for this visit:  History of uveitis  Iridocyclitis, right eye  -     OCT, Retina - OU - Both Eyes  Visual distortions of shape and size  -     OCT, Retina - OU - Both Eyes      Thi scan be shingles associated      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.9 Iridocyclitis, right eye-Improving  2 H25.812 Combined form of age-related cataract, left eye-Stable  3 H52.01 Hyperopia of right eye with astigmatism and presbyopia-Stable  4 H52.12 Myopia of left eye with astigmatism and presbyopia-Stable  5 Z96.1 Pseudophakia of right eye-Stable      1 Anterior uveitis OD non granulomatous improving onPF BID  hx of HSV positive  Off PF  Continue another r week then taper francoise 1 week    3 week total  Get Yag now    now f/u after  months  no uveitis   on no drops currently         Hi quality OCT  scans obtained  signal good    OCT OD - Normal Foveal Contour, No Edema, IS/OS Junction Normal  OCT OS - Normal Foveal Contour, No Edema, IS/OS [place]unction Normal    additional commnents:      Fu 6m

## 2025-03-03 ENCOUNTER — APPOINTMENT (OUTPATIENT)
Dept: PRIMARY CARE | Facility: CLINIC | Age: 70
End: 2025-03-03
Payer: MEDICARE

## 2025-03-31 ENCOUNTER — TELEPHONE (OUTPATIENT)
Dept: PRIMARY CARE | Facility: CLINIC | Age: 70
End: 2025-03-31
Payer: MEDICARE

## 2025-03-31 DIAGNOSIS — I10 ESSENTIAL HYPERTENSION: ICD-10-CM

## 2025-03-31 RX ORDER — LOSARTAN POTASSIUM AND HYDROCHLOROTHIAZIDE 25; 100 MG/1; MG/1
1 TABLET ORAL DAILY
Qty: 30 TABLET | Refills: 5 | Status: SHIPPED | OUTPATIENT
Start: 2025-03-31 | End: 2025-09-27

## 2025-03-31 NOTE — TELEPHONE ENCOUNTER
Rx Refill Request Telephone Encounter    Name:  Astrid Travis  :  [id number]  Medication Name:  losartan-hydrochlorothiazide (Hyzaar) 100-25 mg tablet       Specific Pharmacy location:  [facility]

## 2025-04-23 ENCOUNTER — PATIENT OUTREACH (OUTPATIENT)
Dept: PRIMARY CARE | Facility: CLINIC | Age: 70
End: 2025-04-23
Payer: MEDICARE

## 2025-04-23 NOTE — PROGRESS NOTES
Michele communication for 90 day TCM post discharge outreach.   Patient has met target of no readmission for 90 days post hospital discharge and is graduated from Transitional Care Management program at this time.

## 2025-05-02 ENCOUNTER — OFFICE VISIT (OUTPATIENT)
Dept: PRIMARY CARE | Facility: CLINIC | Age: 70
End: 2025-05-02
Payer: MEDICARE

## 2025-05-02 VITALS
OXYGEN SATURATION: 96 % | HEART RATE: 73 BPM | HEIGHT: 64 IN | BODY MASS INDEX: 40.82 KG/M2 | DIASTOLIC BLOOD PRESSURE: 70 MMHG | TEMPERATURE: 93 F | SYSTOLIC BLOOD PRESSURE: 120 MMHG

## 2025-05-02 DIAGNOSIS — L03.116 CELLULITIS OF LEFT LOWER EXTREMITY: Primary | ICD-10-CM

## 2025-05-02 DIAGNOSIS — I87.2 VENOUS INSUFFICIENCY OF LEFT LEG: ICD-10-CM

## 2025-05-02 RX ORDER — CEPHALEXIN 500 MG/1
500 CAPSULE ORAL 3 TIMES DAILY
Qty: 30 CAPSULE | Refills: 0 | Status: SHIPPED | OUTPATIENT
Start: 2025-05-02 | End: 2025-05-12

## 2025-05-02 ASSESSMENT — ENCOUNTER SYMPTOMS
LOSS OF SENSATION IN FEET: 0
DEPRESSION: 0
OCCASIONAL FEELINGS OF UNSTEADINESS: 0

## 2025-05-02 ASSESSMENT — PATIENT HEALTH QUESTIONNAIRE - PHQ9
1. LITTLE INTEREST OR PLEASURE IN DOING THINGS: NOT AT ALL
2. FEELING DOWN, DEPRESSED OR HOPELESS: NOT AT ALL
SUM OF ALL RESPONSES TO PHQ9 QUESTIONS 1 AND 2: 0

## 2025-05-02 NOTE — PATIENT INSTRUCTIONS
Treat for cellulitis with Keflex as directed  Continue to elevate and use compression for swelling and to help with healing  Discussed importance of continue to compress and elevated ankle ongoing to prevent recurrence  Ok to use Tylenol for pain as needed  Continue your Xarelto as prescribed  Follow up with any changes in symptoms

## 2025-05-02 NOTE — PROGRESS NOTES
"Subjective   Patient ID: Astrid Travis is a 70 y.o. female who presents for Leg Pain (Left side)    History of Present Illness  The patient presents for evaluation of leg pain.    The chief complaint is intermittent soreness in the left lower leg, described as throbbing. The onset of discomfort was approximately 2 weeks ago. Has been intermittent. No specific injury to the area is recalled, but an incident involving numbness in the foot after sitting on a high bar stool is mentioned. Compression stockings have been used as part of the management strategy. Xarelto is currently being taken without any missed doses. Progressive worsening of the veins is reported. Participation in water aerobics is ongoing, and advice is sought regarding the continuation of this activity. Consideration of Tylenol for pain management is mentioned.       Review of Systems    Objective     /70 (BP Location: Right arm, Patient Position: Sitting, BP Cuff Size: Large adult)   Pulse 73   Temp (!) 33.9 °C (93 °F) (Temporal)   Ht 1.626 m (5' 4\")   SpO2 96%   BMI 40.82 kg/m²      Physical Exam  Vitals and nursing note reviewed.   Constitutional:       General: She is not in acute distress.     Appearance: Normal appearance. She is obese.   Musculoskeletal:      Left lower le+ Pitting Edema (foot and ankle) present.   Skin:     General: Skin is warm.      Capillary Refill: Capillary refill takes less than 2 seconds.      Findings: Erythema (left medial ankle with central tenderness, hot to palpation) present.      Comments: Moderate to severe varicose veins, spider veins       Assessment/Plan   Diagnoses and all orders for this visit:  Cellulitis of left lower extremity  -     cephalexin (Keflex) 500 mg capsule; Take 1 capsule (500 mg) by mouth 3 times a day for 10 days.  Venous insufficiency of left leg    Assessment & Plan  1. Cellulitis.  - The condition is likely due to circulatory issues, including varicose veins, " particularly exacerbated by prolonged standing.  - The possibility of a blood clot is low given consistent use of Xarelto.  - Advised to continue wearing compression stockings and elevate the leg to heart level.  - Prescription for Keflex provided. Informed that the hardness in the affected area may persist for 1 to 2 weeks, but the pain should subside within a few days. Advised to take Tylenol for pain management. If there is no improvement or if radiating pain in the calf occurs, immediate notification to the clinic is necessary.       Patient Instructions   Treat for cellulitis with Keflex as directed  Continue to elevate and use compression for swelling and to help with healing  Discussed importance of continue to compress and elevated ankle ongoing to prevent recurrence  Ok to use Tylenol for pain as needed  Continue your Xarelto as prescribed  Follow up with any changes in symptoms     This medical note was created with the assistance of artificial intelligence (AI) for documentation purposes. The content has been reviewed and confirmed by the healthcare provider for accuracy and completeness. Patient consented to the use of audio recording and use of AI during their visit.

## 2025-06-11 ENCOUNTER — APPOINTMENT (OUTPATIENT)
Dept: PRIMARY CARE | Facility: CLINIC | Age: 70
End: 2025-06-11
Payer: COMMERCIAL

## 2025-07-03 ENCOUNTER — APPOINTMENT (OUTPATIENT)
Dept: DERMATOLOGY | Facility: CLINIC | Age: 70
End: 2025-07-03
Payer: MEDICARE

## 2025-07-03 DIAGNOSIS — D22.9 MELANOCYTIC NEVUS, UNSPECIFIED LOCATION: ICD-10-CM

## 2025-07-03 DIAGNOSIS — D23.9 DERMATOFIBROMA: ICD-10-CM

## 2025-07-03 DIAGNOSIS — D48.5 NEOPLASM OF UNCERTAIN BEHAVIOR OF SKIN: Primary | ICD-10-CM

## 2025-07-03 DIAGNOSIS — L57.9 SKIN CHANGES DUE TO CHRONIC EXPOSURE TO NONIONIZING RADIATION: ICD-10-CM

## 2025-07-03 DIAGNOSIS — L82.1 SEBORRHEIC KERATOSIS: ICD-10-CM

## 2025-07-03 DIAGNOSIS — Z12.83 SCREENING EXAM FOR SKIN CANCER: ICD-10-CM

## 2025-07-03 DIAGNOSIS — D18.01 HEMANGIOMA OF SKIN: ICD-10-CM

## 2025-07-03 DIAGNOSIS — L81.4 LENTIGO: ICD-10-CM

## 2025-07-03 NOTE — PROGRESS NOTES
Subjective     Astrid Travis is a 70 y.o. female who presents for the following: Skin Check (FBSE. Lesions of concern. Right upper arm, back and back of legs. You saw this patient at the mobile screening. No family hx of cancer. ).          Review of Systems:  No other skin or systemic complaints other than what is documented elsewhere in the note.    The following portions of the chart were reviewed this encounter and updated as appropriate:         Skin Cancer History  Biopsy Log Book  No skin cancers from Specimen Tracking.    Additional History      Specialty Problems          Dermatology Problems    Mycotic toenails    Lipodermatosclerosis        Objective   Well appearing patient in no apparent distress; mood and affect are within normal limits.    A full examination was performed including scalp, head, eyes, ears, nose, lips, neck, chest, axillae, abdomen, back, buttocks, bilateral upper extremities, bilateral lower extremities, hands, feet, fingers, toes, fingernails, and toenails. All findings within normal limits unless otherwise noted below.    Assessment/Plan   Skin Exam  1. NEOPLASM OF UNCERTAIN BEHAVIOR OF SKIN  Right Upper Arm - Anterior  8 mm pink pearly papule     Lesion biopsy  Type of biopsy: tangential    Informed consent: discussed and consent obtained    Timeout: patient name, date of birth, surgical site, and procedure verified    Procedure prep:  Patient was prepped and draped  Anesthesia: the lesion was anesthetized in a standard fashion    Anesthetic:  1% lidocaine w/ epinephrine 1-100,000 local infiltration  Instrument used: DermaBlade    Hemostasis achieved with: aluminum chloride    Outcome: patient tolerated procedure well    Post-procedure details: sterile dressing applied and wound care instructions given    Dressing type: petrolatum and bandage      Staff Communication: Dermatology Local Anesthesia: 1 % Lidocaine / Epinephrine - Amount: 3 ml  Specimen 1 - Dermatopathology- DERM  LAB  Differential Diagnosis: r/o bcc  Check Margins Yes/No?:    Comments:    Dermpath Lab: Routine Histopathology (formalin-fixed tissue)  Concerning lesion found on exam. DDX for lesion includes: bcc vs blk. The need for biopsy to aid in diagnosis was discussed and recommended. Risks and benefits of biopsy were reviewed. See procedure note.  2. SKIN CHANGES DUE TO CHRONIC EXPOSURE TO NONIONIZING RADIATION  Generalized  Mottled pigmentation, telangiectasias and brown reticular macules in sun exposed areas on the face, extremities, trunk  The risk of chronic, cumulative sun damage and risk of development of skin cancer was reviewed with the patient today. Discussed important of sun protection with sun protective clothing and/or broad spectrum sunscreen spf 30 or above.  Warning signs of skin cancer were reviewed. Patient to contact office should they notice any new or changing pre-existing skin lesion.  3. SCREENING EXAM FOR SKIN CANCER  Generalized  4. DERMATOFIBROMA  Left Lower Leg - Anterior  Firm pink-brown papule that dimples with lateral pressure.  Discussed benign nature of condition, reassured. Reviewed warning signs of skin cancer with patient.  5. LENTIGO  Generalized  Scattered tan macules in sun-exposed areas.  Benign nature of these skin lesions reviewed and relation to sun exposure discussed. Reassurance provided. Reviewed warning signs of skin cancer.  6. HEMANGIOMA OF SKIN  Generalized  Bright red papules  Discussed benign nature of condition, reassured. Reviewed warning signs of skin cancer with patient.  7. SEBORRHEIC KERATOSIS  Generalized  Stuck on verrucous, tan-brown papules and plaques.    The benign nature of these skin lesions were reviewed with the patient today and reassurance was provided. Patient advised to return for f/u if the lesions change in size, shape, color, become painful, tender, itch or bleed.  8. MELANOCYTIC NEVUS, UNSPECIFIED LOCATION  Generalized  Benign to slightly atypical  appearing brown pigmented macules and papules  Clinically benign appearing nevi, reassurance provided to the patient today. Discussed important of sun protection with sun protective clothing and/or broad spectrum sunscreen spf 30 or above.  ABCDEs of melanoma reviewed. Patient to f/u should they notice any new or changing pre-existing skin lesion.

## 2025-07-03 NOTE — Clinical Note
8 mm pink pearly papule   
Benign nature of these skin lesions reviewed and relation to sun exposure discussed. Reassurance provided. Reviewed warning signs of skin cancer.  
Benign to slightly atypical appearing brown pigmented macules and papules  
Bright red papules  
Clinically benign appearing nevi, reassurance provided to the patient today. Discussed important of sun protection with sun protective clothing and/or broad spectrum sunscreen spf 30 or above.  ABCDEs of melanoma reviewed. Patient to f/u should they notice any new or changing pre-existing skin lesion.  
Concerning lesion found on exam. DDX for lesion includes: bcc vs blk. The need for biopsy to aid in diagnosis was discussed and recommended. Risks and benefits of biopsy were reviewed. See procedure note.  
Detail Level: Simple
Discussed benign nature of condition, reassured. Reviewed warning signs of skin cancer with patient.  
Discussed benign nature of condition, reassured. Reviewed warning signs of skin cancer with patient.  
Firm pink-brown papule that dimples with lateral pressure.  
Mottled pigmentation, telangiectasias and brown reticular macules in sun exposed areas on the face, extremities, trunk  
Scattered tan macules in sun-exposed areas.  
Stuck on verrucous, tan-brown papules and plaques.    
The benign nature of these skin lesions were reviewed with the patient today and reassurance was provided. Patient advised to return for f/u if the lesions change in size, shape, color, become painful, tender, itch or bleed.  
The risk of chronic, cumulative sun damage and risk of development of skin cancer was reviewed with the patient today. Discussed important of sun protection with sun protective clothing and/or broad spectrum sunscreen spf 30 or above.  Warning signs of skin cancer were reviewed. Patient to contact office should they notice any new or changing pre-existing skin lesion.  
Additional Notes: Patient consent was obtained to proceed with the visit and recommended plan of care after discussion of all risks and benefits, including the risks of COVID-19 exposure.

## 2025-07-08 LAB
LABORATORY COMMENT REPORT: NORMAL
PATH REPORT.FINAL DX SPEC: NORMAL
PATH REPORT.GROSS SPEC: NORMAL
PATH REPORT.MICROSCOPIC SPEC OTHER STN: NORMAL
PATH REPORT.RELEVANT HX SPEC: NORMAL
PATH REPORT.TOTAL CANCER: NORMAL

## 2025-07-14 DIAGNOSIS — R07.9 CHEST PAIN, UNSPECIFIED TYPE: ICD-10-CM

## 2025-07-14 DIAGNOSIS — F33.1 MAJOR DEPRESSIVE DISORDER, RECURRENT EPISODE, MODERATE: ICD-10-CM

## 2025-07-14 RX ORDER — ESCITALOPRAM OXALATE 10 MG/1
10 TABLET ORAL DAILY
Qty: 30 TABLET | Refills: 5 | Status: SHIPPED | OUTPATIENT
Start: 2025-07-14

## 2025-07-14 RX ORDER — ROSUVASTATIN CALCIUM 10 MG/1
10 TABLET, COATED ORAL NIGHTLY
Qty: 30 TABLET | Refills: 5 | Status: SHIPPED | OUTPATIENT
Start: 2025-07-14 | End: 2026-01-10

## 2025-07-26 DIAGNOSIS — I10 ESSENTIAL HYPERTENSION: ICD-10-CM

## 2025-07-27 RX ORDER — LOSARTAN POTASSIUM AND HYDROCHLOROTHIAZIDE 25; 100 MG/1; MG/1
1 TABLET ORAL DAILY
Qty: 90 TABLET | Refills: 1 | Status: SHIPPED | OUTPATIENT
Start: 2025-07-27

## 2025-08-05 ENCOUNTER — APPOINTMENT (OUTPATIENT)
Dept: DERMATOLOGY | Facility: CLINIC | Age: 70
End: 2025-08-05
Payer: MEDICARE

## 2025-08-05 DIAGNOSIS — C44.612 BASAL CELL CARCINOMA (BCC) OF RIGHT UPPER EXTREMITY: Primary | ICD-10-CM

## 2025-08-05 PROCEDURE — 17262 DSTRJ MAL LES T/A/L 1.1-2.0: CPT | Performed by: STUDENT IN AN ORGANIZED HEALTH CARE EDUCATION/TRAINING PROGRAM

## 2025-08-05 PROCEDURE — 1159F MED LIST DOCD IN RCRD: CPT | Performed by: STUDENT IN AN ORGANIZED HEALTH CARE EDUCATION/TRAINING PROGRAM

## 2025-08-06 NOTE — PROGRESS NOTES
Subjective     Astrid Travis is a 70 y.o. female who presents for the following: ED&C (Electrodesiccation & Curettage) (To right upper arm, BCC, Ref# [id number]. ).          Review of Systems:  No other skin or systemic complaints other than what is documented elsewhere in the note.    The following portions of the chart were reviewed this encounter and updated as appropriate:          Skin Cancer History  Biopsy Log Book  Biopsied Type Location Status   7/3/25 BCC, Superficial Right Upper Arm - Anterior Treatment Complete - ED&C  8/5/25       Additional History      Specialty Problems          Dermatology Problems    Mycotic toenails    Lipodermatosclerosis        Objective   Well appearing patient in no apparent distress; mood and affect are within normal limits.    A focused skin examination was performed. All findings within normal limits unless otherwise noted below.    Assessment/Plan   Skin Exam  1. BASAL CELL CARCINOMA (BCC) OF RIGHT UPPER EXTREMITY  Right Upper Arm - Anterior  8 mm pink macule  - Destr of lesion  Complexity: simple    Destruction method: electrodesiccation and curettage    Informed consent: discussed and consent obtained    Timeout:  patient name, date of birth, surgical site, and procedure verified  Procedure prep:  Patient was prepped and draped  Anesthesia: the lesion was anesthetized in a standard fashion    Anesthetic:  1% lidocaine w/ epinephrine 1-100,000 local infiltration  Curettage performed in three different directions: Yes    Electrodesiccation performed over the curetted area: Yes    Curettage cycles:  3  Lesion length (cm):  0.8  Lesion width (cm):  0.8  Margin per side (cm):  0.4  Final wound size (cm):  1.6  Hemostasis achieved with:  electrodesiccation  Outcome: patient tolerated procedure well with no complications    Post-procedure details: sterile dressing applied and wound care instructions given    Dressing type: bandage and petrolatum

## 2025-08-15 ENCOUNTER — APPOINTMENT (OUTPATIENT)
Dept: OPHTHALMOLOGY | Facility: CLINIC | Age: 70
End: 2025-08-15
Payer: MEDICARE

## 2025-08-15 DIAGNOSIS — B39.9 HISTOPLASMOSIS: ICD-10-CM

## 2025-08-15 DIAGNOSIS — H26.491 PCO (POSTERIOR CAPSULAR OPACIFICATION), RIGHT: ICD-10-CM

## 2025-08-15 DIAGNOSIS — H53.15 VISUAL DISTORTIONS OF SHAPE AND SIZE: Primary | ICD-10-CM

## 2025-08-15 DIAGNOSIS — Z86.69 HISTORY OF UVEITIS: ICD-10-CM

## 2025-08-15 RX ORDER — VALACYCLOVIR HYDROCHLORIDE 500 MG/1
500 TABLET, FILM COATED ORAL 2 TIMES DAILY
Qty: 60 TABLET | Refills: 11 | Status: SHIPPED | OUTPATIENT
Start: 2025-08-15 | End: 2026-08-15

## 2025-08-15 ASSESSMENT — ENCOUNTER SYMPTOMS
EYES NEGATIVE: 1
CONSTITUTIONAL NEGATIVE: 0
ENDOCRINE NEGATIVE: 0
HEMATOLOGIC/LYMPHATIC NEGATIVE: 0
GASTROINTESTINAL NEGATIVE: 0
RESPIRATORY NEGATIVE: 0
MUSCULOSKELETAL NEGATIVE: 0
NEUROLOGICAL NEGATIVE: 0
ALLERGIC/IMMUNOLOGIC NEGATIVE: 0
PSYCHIATRIC NEGATIVE: 0
CARDIOVASCULAR NEGATIVE: 0

## 2025-08-15 ASSESSMENT — SLIT LAMP EXAM - LIDS
COMMENTS: NORMAL
COMMENTS: NORMAL

## 2025-08-15 ASSESSMENT — CUP TO DISC RATIO
OD_RATIO: 0.3
OS_RATIO: 0.3

## 2025-08-15 ASSESSMENT — VISUAL ACUITY
OS_SC+: -3
OD_SC+: -3
OD_SC: 20/25
METHOD: SNELLEN - LINEAR
OS_SC: 20/20

## 2025-08-15 ASSESSMENT — TONOMETRY
IOP_METHOD: GOLDMANN APPLANATION
OD_IOP_MMHG: 14
OS_IOP_MMHG: 14

## 2025-08-15 ASSESSMENT — EXTERNAL EXAM - LEFT EYE: OS_EXAM: NORMAL

## 2025-08-15 ASSESSMENT — EXTERNAL EXAM - RIGHT EYE: OD_EXAM: NORMAL

## 2025-08-29 ENCOUNTER — APPOINTMENT (OUTPATIENT)
Dept: OPHTHALMOLOGY | Facility: CLINIC | Age: 70
End: 2025-08-29
Payer: MEDICARE

## 2025-09-01 DIAGNOSIS — Z79.01 ANTICOAGULATION ADEQUATE WITH ANTICOAGULANT THERAPY: ICD-10-CM

## 2025-09-01 RX ORDER — RIVAROXABAN 20 MG/1
20 TABLET, FILM COATED ORAL
Qty: 90 TABLET | Refills: 1 | Status: SHIPPED | OUTPATIENT
Start: 2025-09-01

## 2026-01-12 ENCOUNTER — APPOINTMENT (OUTPATIENT)
Dept: DERMATOLOGY | Facility: CLINIC | Age: 71
End: 2026-01-12
Payer: MEDICARE

## 2026-02-17 ENCOUNTER — APPOINTMENT (OUTPATIENT)
Dept: OPHTHALMOLOGY | Facility: CLINIC | Age: 71
End: 2026-02-17
Payer: MEDICARE

## (undated) DEVICE — GUIDEWIRE, HI-TORQUE, VERSACORE, 145CM, FLOPPY

## (undated) DEVICE — INTRODUCER, GLIDESHEATH SLENDER A-KIT, 6FR 10CM

## (undated) DEVICE — GUIDEWIRE, INQWIRE, 3MM J, .035 X 210CM, FIXED

## (undated) DEVICE — CATHETER, ANGIO, IMPULSE, FL3.5, 5 FR X 100 CM

## (undated) DEVICE — TR BAND, RADIAL COMPRESSION, STANDARD, 24CM

## (undated) DEVICE — CATHETER, DIAGNOSTIC, 4 FR-JL 3.5

## (undated) DEVICE — CATHETER, ANGIO, IMPULSE, FR4, 5 FR X 100 CM

## (undated) DEVICE — CATHETER, DIAGNOSTIC, 4 FR-JR 4